# Patient Record
Sex: MALE | Race: WHITE | NOT HISPANIC OR LATINO | Employment: STUDENT | URBAN - METROPOLITAN AREA
[De-identification: names, ages, dates, MRNs, and addresses within clinical notes are randomized per-mention and may not be internally consistent; named-entity substitution may affect disease eponyms.]

---

## 2020-01-08 ENCOUNTER — TRANSFERRED RECORDS (OUTPATIENT)
Dept: HEALTH INFORMATION MANAGEMENT | Facility: CLINIC | Age: 20
End: 2020-01-08

## 2022-03-09 ENCOUNTER — TRANSFERRED RECORDS (OUTPATIENT)
Dept: HEALTH INFORMATION MANAGEMENT | Facility: CLINIC | Age: 22
End: 2022-03-09

## 2023-09-19 ENCOUNTER — OFFICE VISIT (OUTPATIENT)
Dept: FAMILY MEDICINE | Facility: CLINIC | Age: 23
End: 2023-09-19
Payer: COMMERCIAL

## 2023-09-19 VITALS
HEART RATE: 80 BPM | WEIGHT: 183.3 LBS | RESPIRATION RATE: 13 BRPM | BODY MASS INDEX: 27.15 KG/M2 | SYSTOLIC BLOOD PRESSURE: 108 MMHG | DIASTOLIC BLOOD PRESSURE: 74 MMHG | HEIGHT: 69 IN | TEMPERATURE: 97.6 F

## 2023-09-19 DIAGNOSIS — F32.A ANXIETY AND DEPRESSION: ICD-10-CM

## 2023-09-19 DIAGNOSIS — Z00.00 ROUTINE GENERAL MEDICAL EXAMINATION AT A HEALTH CARE FACILITY: ICD-10-CM

## 2023-09-19 DIAGNOSIS — F41.9 ANXIETY AND DEPRESSION: ICD-10-CM

## 2023-09-19 DIAGNOSIS — K52.9 CHRONIC DIARRHEA: ICD-10-CM

## 2023-09-19 DIAGNOSIS — Z11.59 NEED FOR HEPATITIS C SCREENING TEST: ICD-10-CM

## 2023-09-19 DIAGNOSIS — G43.109 MIGRAINE WITH AURA AND WITHOUT STATUS MIGRAINOSUS, NOT INTRACTABLE: ICD-10-CM

## 2023-09-19 LAB
ALBUMIN SERPL BCG-MCNC: 4.7 G/DL (ref 3.5–5.2)
ALP SERPL-CCNC: 72 U/L (ref 40–129)
ALT SERPL W P-5'-P-CCNC: 22 U/L (ref 0–70)
ANION GAP SERPL CALCULATED.3IONS-SCNC: 10 MMOL/L (ref 7–15)
AST SERPL W P-5'-P-CCNC: 29 U/L (ref 0–45)
BILIRUB SERPL-MCNC: 0.3 MG/DL
BUN SERPL-MCNC: 13.6 MG/DL (ref 6–20)
CALCIUM SERPL-MCNC: 9.5 MG/DL (ref 8.6–10)
CHLORIDE SERPL-SCNC: 103 MMOL/L (ref 98–107)
CHOLEST SERPL-MCNC: 124 MG/DL
CREAT SERPL-MCNC: 0.92 MG/DL (ref 0.67–1.17)
CRP SERPL-MCNC: <3 MG/L
DEPRECATED HCO3 PLAS-SCNC: 27 MMOL/L (ref 22–29)
EGFRCR SERPLBLD CKD-EPI 2021: >90 ML/MIN/1.73M2
ERYTHROCYTE [DISTWIDTH] IN BLOOD BY AUTOMATED COUNT: 12.1 % (ref 10–15)
ERYTHROCYTE [SEDIMENTATION RATE] IN BLOOD BY WESTERGREN METHOD: 2 MM/HR (ref 0–15)
GLUCOSE SERPL-MCNC: 90 MG/DL (ref 70–99)
HCT VFR BLD AUTO: 48.2 % (ref 40–53)
HDLC SERPL-MCNC: 35 MG/DL
HGB BLD-MCNC: 16.2 G/DL (ref 13.3–17.7)
LDLC SERPL CALC-MCNC: 52 MG/DL
MCH RBC QN AUTO: 30.3 PG (ref 26.5–33)
MCHC RBC AUTO-ENTMCNC: 33.6 G/DL (ref 31.5–36.5)
MCV RBC AUTO: 90 FL (ref 78–100)
NONHDLC SERPL-MCNC: 89 MG/DL
PLATELET # BLD AUTO: 228 10E3/UL (ref 150–450)
POTASSIUM SERPL-SCNC: 4.4 MMOL/L (ref 3.4–5.3)
PROT SERPL-MCNC: 7 G/DL (ref 6.4–8.3)
RBC # BLD AUTO: 5.35 10E6/UL (ref 4.4–5.9)
SODIUM SERPL-SCNC: 140 MMOL/L (ref 136–145)
TRIGL SERPL-MCNC: 187 MG/DL
WBC # BLD AUTO: 7.3 10E3/UL (ref 4–11)

## 2023-09-19 PROCEDURE — 99385 PREV VISIT NEW AGE 18-39: CPT | Performed by: NURSE PRACTITIONER

## 2023-09-19 PROCEDURE — 85027 COMPLETE CBC AUTOMATED: CPT | Performed by: NURSE PRACTITIONER

## 2023-09-19 PROCEDURE — 36415 COLL VENOUS BLD VENIPUNCTURE: CPT | Performed by: NURSE PRACTITIONER

## 2023-09-19 PROCEDURE — 86803 HEPATITIS C AB TEST: CPT | Performed by: NURSE PRACTITIONER

## 2023-09-19 PROCEDURE — 85652 RBC SED RATE AUTOMATED: CPT | Performed by: NURSE PRACTITIONER

## 2023-09-19 PROCEDURE — 80053 COMPREHEN METABOLIC PANEL: CPT | Performed by: NURSE PRACTITIONER

## 2023-09-19 PROCEDURE — 80061 LIPID PANEL: CPT | Performed by: NURSE PRACTITIONER

## 2023-09-19 PROCEDURE — 86140 C-REACTIVE PROTEIN: CPT | Performed by: NURSE PRACTITIONER

## 2023-09-19 RX ORDER — PAROXETINE 10 MG/1
TABLET, FILM COATED ORAL
Qty: 94 TABLET | Refills: 0 | OUTPATIENT
Start: 2023-09-19 | End: 2023-11-05

## 2023-09-19 RX ORDER — PAROXETINE 10 MG/1
TABLET, FILM COATED ORAL
Qty: 94 TABLET | Refills: 0 | Status: SHIPPED | OUTPATIENT
Start: 2023-09-19 | End: 2023-10-18

## 2023-09-19 ASSESSMENT — PAIN SCALES - GENERAL: PAINLEVEL: NO PAIN (0)

## 2023-09-19 ASSESSMENT — ENCOUNTER SYMPTOMS
NAUSEA: 0
SHORTNESS OF BREATH: 0
DYSURIA: 0
EYE PAIN: 0
DIARRHEA: 1
HEMATOCHEZIA: 0
CONSTIPATION: 0
ARTHRALGIAS: 0
COUGH: 0
PARESTHESIAS: 0
MYALGIAS: 0
JOINT SWELLING: 0
WEAKNESS: 0
CHILLS: 0
HEADACHES: 1
SORE THROAT: 0
FREQUENCY: 0
HEARTBURN: 0
FEVER: 0
DIZZINESS: 0
ABDOMINAL PAIN: 0
HEMATURIA: 0
NERVOUS/ANXIOUS: 1
PALPITATIONS: 0

## 2023-09-19 ASSESSMENT — PATIENT HEALTH QUESTIONNAIRE - PHQ9: SUM OF ALL RESPONSES TO PHQ QUESTIONS 1-9: 10

## 2023-09-19 NOTE — PROGRESS NOTES
SUBJECTIVE:   CC: Jose Manuel is an 23 year old with past medical history of migraine with aura, anxiety and depression, who presents for preventative health visit.     Concerns/Updates:   Anxiety and depression: Has been receiving weekly psychotherapy at outside location.Therapist recommended starting a medication and he would like a prescription.  Chronic diarrhea for about 2 years. Has been taking pepto-bismol as needed, metamucil, and a probiotic. Those meds help a bit but symptoms persist. Anxiety triggers the diarrhea - having episodes maybe one a week. No fever.  Migraines with aura since 2016- relieved by OTC Excedrin.     He is originally from [place], [place]. Moved to [place] last year for [employer]. 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  Denies drinking alcohol, smoking, or drug use  Declines STI screening- has not been sexually active since his last STI screening about 2 years ago.        9/19/2023     1:56 PM   Additional Questions   Roomed by Vero PIKE       Healthy Habits:     Getting at least 3 servings of Calcium per day:  Yes    Bi-annual eye exam:  Yes    Dental care twice a year:  Yes    Sleep apnea or symptoms of sleep apnea:  Daytime drowsiness    Diet:  Regular (no restrictions)    Frequency of exercise:  2-3 days/week    Duration of exercise:  45-60 minutes    Taking medications regularly:  Not Applicable    Medication side effects:  Not applicable    Additional concerns today:  Yes      Today's PHQ-2 Score:       9/19/2023     2:08 PM   PHQ-2 ( 1999 [employer])   Q1: Little interest or pleasure in doing things 1   Q2: Feeling down, depressed or hopeless 1   PHQ-2 Score 2   Q1: Little interest or pleasure in doing things Several days   Q2: Feeling down, depressed or hopeless Several days   PHQ-2 Score 2     PATIENT HEALTH QUESTIONNAIRE-9 (PHQ - 9)    Over the last 2 weeks, how often have  you been bothered by any of the following problems?    1. Little interest or pleasure in doing things -  Several days   2. Feeling down, depressed, or hopeless -  More than half the days   3. Trouble falling or staying asleep, or sleeping too much - Nearly every day   4. Feeling tired or having little energy -  Nearly every day   5. Poor appetite or overeating -  Not at all   6. Feeling bad about yourself - or that you are a failure or have let yourself or your family down -  Several days   7. Trouble concentrating on things, such as reading the newspaper or watching television - Not at all   8. Moving or speaking so slowly that other people could have noticed? Or the opposite - being so fidgety or restless that you have been moving around a lot more than usual Not at all   9. Thoughts that you would be better off dead or of hurting  yourself in some way Not at all   Total Score: 10     If you checked off any problems, how difficult have these problems made it for you to do your work, take care of things at home, or get along with other people? Somewhat difficult    Developed by Bhakti Lira, Alma Hawkins, Wilfrido Cardenas and colleagues, with an educational grace from [employer] No permission required to reproduce, translate, display or distribute. permission required to reproduce, translate, display or distribute.      Have you ever done Advance Care Planning? (For example, a Health Directive, POLST, or a discussion with a medical provider or your loved ones about your wishes): Yes, patient states has an Advance Care Planning document and will bring a copy to the clinic.    Social History     Tobacco Use    Smoking status: Not on file    Smokeless tobacco: Not on file   Substance Use Topics    Alcohol use: Not on file             9/19/2023     2:07 PM   Alcohol Use   Prescreen: >3 drinks/day or >7 drinks/week? No       Last PSA: No results found for: PSA    Reviewed orders with patient. Reviewed  "health maintenance and updated orders accordingly - Yes  Lab work is in process  Labs reviewed in EPIC    Reviewed and updated as needed this visit by clinical staff    Allergies  Meds  Problems  Med Hx  Surg Hx  Fam Hx          Reviewed and updated as needed this visit by Provider    Allergies  Meds  Problems  Med Hx  Surg Hx  Fam Hx         History reviewed. No pertinent past medical history.   History reviewed. No pertinent surgical history.    Review of Systems   Constitutional:  Negative for chills and fever.   HENT:  Negative for congestion, ear pain, hearing loss and sore throat.    Eyes:  Negative for pain and visual disturbance.   Respiratory:  Negative for cough and shortness of breath.    Cardiovascular:  Negative for chest pain, palpitations and peripheral edema.   Gastrointestinal:  Positive for diarrhea. Negative for abdominal pain, constipation, heartburn, hematochezia and nausea.   Genitourinary:  Negative for dysuria, frequency, genital sores, hematuria, impotence, penile discharge and urgency.   Musculoskeletal:  Negative for arthralgias, joint swelling and myalgias.   Skin:  Negative for rash.   Neurological:  Positive for headaches. Negative for dizziness, weakness and paresthesias.   Psychiatric/Behavioral:  Negative for mood changes. The patient is nervous/anxious.          OBJECTIVE:   /74   Pulse 80   Temp 97.6  F (36.4  C) (Tympanic)   Resp 13   Ht 1.755 m (5' 9.09\")   Wt 83.1 kg (183 lb 4.8 oz)   BMI 27.00 kg/m      Physical Exam  GENERAL: healthy, alert and no distress  EYES: Eyes grossly normal to inspection, PERRL and conjunctivae and sclerae normal  HENT: ear canals and TM's normal, nose and mouth without ulcers or lesions  NECK: no adenopathy, no asymmetry, masses, or scars and thyroid normal to palpation  RESP: lungs clear to auscultation - no rales, rhonchi or wheezes  CV: regular rate and rhythm, normal S1 S2, no S3 or S4, no murmur, click or rub, no " peripheral edema and peripheral pulses strong  ABDOMEN: soft, nontender, no hepatosplenomegaly, no masses and bowel sounds normal  MS: no gross musculoskeletal defects noted, no edema  SKIN: no suspicious lesions or rashes  NEURO: Normal strength and tone, mentation intact and speech normal  PSYCH: mentation appears normal, affect normal/bright    Diagnostic Test Results:  Labs reviewed in Epic    ASSESSMENT/PLAN:   Jose Manuel was seen today for annual visit.    Diagnoses and all orders for this visit:    Routine general medical examination at a health care facility  -     REVIEW OF HEALTH MAINTENANCE PROTOCOL ORDERS  -     Lipid panel reflex to direct LDL Fasting; Future  -     Lipid panel reflex to direct LDL Fasting   -Declines vaccines today     Need for hepatitis C screening test  -     Hepatitis C Screen Reflex to HCV RNA Quant and Genotype; Future  -     Hepatitis C Screen Reflex to HCV RNA Quant and Genotype    Migraine with aura and without status migrainosus, not intractable  Controlled with Excedrin    Chronic diarrhea  -     Adult GI  Referral - Consult Only; Future  -     ESR: Erythrocyte sedimentation rate; Future  -     CRP, inflammation; Future  -     CBC with platelets; Future  -     Comprehensive metabolic panel (BMP + Alb, Alk Phos, ALT, AST, Total. Bili, TP); Future  -     Calprotectin Feces; Future  -     ESR: Erythrocyte sedimentation rate  -     CRP, inflammation  -     CBC with platelets  -     Comprehensive metabolic panel (BMP + Alb, Alk Phos, ALT, AST, Total. Bili, TP)  -     Calprotectin Feces    Anxiety and depression  -     PARoxetine (PAXIL) 10 MG tablet; Take 2 tablets (20 mg) by mouth every morning for 7 days, THEN 2 tablets (20 mg) every morning for 40 days.   -Continue psychotherapy   -f/up in 4 weeks    COUNSELING:   Reviewed preventive health counseling, as reflected in patient instructions      BMI:   Estimated body mass index is 27 kg/m  as calculated from the  "following:    Height as of this encounter: 1.755 m (5' 9.09\").    Weight as of this encounter: 83.1 kg (183 lb 4.8 oz).   Weight management plan: Discussed healthy diet and exercise guidelines      He has no history on file for tobacco use.            JAMI Colmenares CNP  [facility]  "

## 2023-09-20 PROBLEM — F41.9 ANXIETY AND DEPRESSION: Status: ACTIVE | Noted: 2023-09-20

## 2023-09-20 PROBLEM — F32.A ANXIETY AND DEPRESSION: Status: ACTIVE | Noted: 2023-09-20

## 2023-09-21 ENCOUNTER — APPOINTMENT (OUTPATIENT)
Dept: LAB | Facility: CLINIC | Age: 23
End: 2023-09-21
Payer: COMMERCIAL

## 2023-09-21 LAB — HCV AB SERPL QL IA: NONREACTIVE

## 2023-09-21 PROCEDURE — 83993 ASSAY FOR CALPROTECTIN FECAL: CPT | Performed by: NURSE PRACTITIONER

## 2023-09-22 LAB — CALPROTECTIN STL-MCNT: 17.9 MG/KG (ref 0–49.9)

## 2023-10-03 NOTE — TELEPHONE ENCOUNTER
REFERRAL INFORMATION:  Referring Provider:  Es Mcclain APRN CNP  Referring Clinic:  [facility]   Reason for Visit/Diagnosis: Chronic diarrhea [K52.9]     FUTURE VISIT INFORMATION:  Appointment Date: 10/5/23  Appointment Time: 11:00 AM      NOTES STATUS DETAILS   OFFICE NOTE from Referring Provider Internal 9/19/23- Es Mcclain APRN CNP - [facility] - Chronic Diarrhea    MEDICATION LIST Internal         STOOL TESTING Internal Calprotectin Feces- 9/21/23   PERTINENT LABS Internal CBC w/ Dif; CMP; CRP Inflammation - 9/19/23

## 2023-10-05 ENCOUNTER — OFFICE VISIT (OUTPATIENT)
Dept: GASTROENTEROLOGY | Facility: CLINIC | Age: 23
End: 2023-10-05
Attending: NURSE PRACTITIONER
Payer: COMMERCIAL

## 2023-10-05 ENCOUNTER — PRE VISIT (OUTPATIENT)
Dept: GASTROENTEROLOGY | Facility: CLINIC | Age: 23
End: 2023-10-05

## 2023-10-05 ENCOUNTER — LAB (OUTPATIENT)
Dept: LAB | Facility: CLINIC | Age: 23
End: 2023-10-05
Payer: COMMERCIAL

## 2023-10-05 VITALS
HEART RATE: 94 BPM | WEIGHT: 184.1 LBS | SYSTOLIC BLOOD PRESSURE: 131 MMHG | BODY MASS INDEX: 27.27 KG/M2 | HEIGHT: 69 IN | DIASTOLIC BLOOD PRESSURE: 81 MMHG

## 2023-10-05 DIAGNOSIS — K52.9 CHRONIC DIARRHEA: ICD-10-CM

## 2023-10-05 LAB — TSH SERPL DL<=0.005 MIU/L-ACNC: 1.14 UIU/ML (ref 0.3–4.2)

## 2023-10-05 PROCEDURE — 82784 ASSAY IGA/IGD/IGG/IGM EACH: CPT | Performed by: PHYSICIAN ASSISTANT

## 2023-10-05 PROCEDURE — 99205 OFFICE O/P NEW HI 60 MIN: CPT | Performed by: PHYSICIAN ASSISTANT

## 2023-10-05 PROCEDURE — 99000 SPECIMEN HANDLING OFFICE-LAB: CPT | Performed by: PATHOLOGY

## 2023-10-05 PROCEDURE — 86364 TISS TRNSGLTMNASE EA IG CLAS: CPT | Performed by: PHYSICIAN ASSISTANT

## 2023-10-05 PROCEDURE — 84443 ASSAY THYROID STIM HORMONE: CPT | Performed by: PATHOLOGY

## 2023-10-05 PROCEDURE — 36415 COLL VENOUS BLD VENIPUNCTURE: CPT | Performed by: PATHOLOGY

## 2023-10-05 RX ORDER — CETIRIZINE HYDROCHLORIDE 5 MG/1
5 TABLET ORAL DAILY
COMMUNITY

## 2023-10-05 RX ORDER — LACTOBACILLUS RHAMNOSUS GG 10B CELL
1 CAPSULE ORAL 2 TIMES DAILY
COMMUNITY

## 2023-10-05 NOTE — NURSING NOTE
"Chief Complaint   Patient presents with    New Patient     Diarrhea        Vitals:    10/05/23 1051   BP: 131/81   BP Location: Left arm   Patient Position: Sitting   Cuff Size: Adult Regular   Pulse: 94   Weight: 83.5 kg (184 lb 1.6 oz)   Height: 1.753 m (5' 9\")       Body mass index is 27.19 kg/m .      Carlie Lema LPN                      "

## 2023-10-05 NOTE — LETTER
"    10/5/2023         RE: Jose Manuel Johnson  [address] [place] [address]        Dear Colleague,    Thank you for referring your patient, Jose Manuel Johnson, to the [facility]. Please see a copy of my visit note below.      GI CLINIC VISIT    CC/REFERRING MD:  Es Clemens  REASON FOR CONSULTATION: Diagnoses: K52.9 (ICD-10-CM) - Chronic diarrhea     Chart Review  9/19 - FM appt w/ mention of chronic diarrhea x 2 year worsened w/ anxiety  ESR/CRP WNL  CBC, CMP wnl  Feces calpro WNL    HPI  Jose Manuel Johnson is a 23 year old year old male with PMHx of anxiety/depression, migraines, lactose intolerance presenting to establish care with the [facility] GI group for chronic diarrhea.     -Reports he has always had a \"nervous stomach\" that flared in pain associated with increased freq of stool and change in stooling (more loose) around stressful events (tests at school).   Does have lactose intolerance which is managed with ingestion of lactaid.     -In last 1-2 years, this diarrhea pattern worsened where before it was intermittent, he was then having daily episodes of loose stools 1-2x/d of BSC type 4-5.     -In last 6 mo, diarrhea patterned worsened in both consistency and frequency prompting him to take daily pepto in order to manage it.  His typical pattern in past 6 mo is the following -- after breakfast, he will have 3 coupled BM described as BSC type 6 then take 1 tabs of pepto bismol. He will then not have any diarrhea or BMs after the pepto. Stooling associated with sensation of tenesmus, gas and passage of mucus.     +Infrequent heartburn maybe 1x every few weeks.   No dysphagia/odynphagia.   +Bloat after starting paxil 1 mo ago. Denies worsening of diarrhea w/ start of paxil.   In July had 2-3d of bad abd pain that self-resolved     Exposure   -Feb 2023 he lived in a NH for 2.5 months. 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 "   -He is currently working in another NH in [facility].     Abx   -Feb 2023 a course of amoxil for sinus infection   -July/Aug 2023 - dental surgery with unknown abx     He started taking probiotics 24 billion CFU, metamucil 2 tsp daily which he feels has helped.   Never required UC/ER eval for fluid replacement / dehydration.     Overall feels his mental health has improved in last 2 years yet diarrhea is worsening.     Weight stable  Appetite stable     Diet Recall  B - honey nut cherrios / cereal  L - ham sandwich with guillaume or PB sandwich, hummus/franny, fruit  D - protein, veggies, carbs, salad whatever  cooks  Snacking - [place] almond butter, banana / fruit     ROS  Denies fevers, chills, weight loss, nausea, emesis, dysphagia, odynophagia, regurgitation,current  abdominal pain,  black tarrying stools, bloody stools, rectal pain, rectal fullness, rectal itching.     Family Hx  Sister with IBS   Dad with 'nervous stomach'  PGM - with 'nervous stomach' hypervigilant when outdoors, mapping location of toilets   Routine colonscopy for both parents, both with polyps (unknown type)      No other known family history or GI related malignancy (esophageal, gastric, pancreatic, liver or colon) or family history of IBD/celiac disease.     Social Hx   Student studying to become a [employer]; in year 2 of 11 year program     Will take Ibuprofen 400 mg, or APAP 1gram once weekly for headaches   No use of OTC herbal supplements/weight loss products.      No ETOH  Never tobacco products.   No recreational drug use.     PROBLEM LIST  Patient Active Problem List    Diagnosis Date Noted    Anxiety and depression 09/20/2023     Priority: Medium    Migraine with aura and without status migrainosus, not intractable 09/19/2023     Priority: Medium    Chronic diarrhea 09/19/2023     Priority: Medium       PERTINENT PAST MEDICAL HISTORY:  No past medical history on file.    PREVIOUS SURGERIES:  No past surgical history on  file.    ALLERGIES:   No Known Allergies    PERTINENT MEDICATIONS:    Current Outpatient Medications:     PARoxetine (PAXIL) 10 MG tablet, Take 2 tablets (20 mg) by mouth every morning for 7 days, THEN 2 tablets (20 mg) every morning for 40 days., Disp: 94 tablet, Rfl: 0    SOCIAL HISTORY:  Social History     Socioeconomic History    Marital status: Single     Spouse name: Not on file    Number of children: Not on file    Years of education: Not on file    Highest education level: Not on file   Occupational History    Not on file   Tobacco Use    Smoking status: Not on file    Smokeless tobacco: Not on file   Substance and Sexual Activity    Alcohol use: Not on file    Drug use: Not on file    Sexual activity: Not on file   Other Topics Concern    Not on file   Social History Narrative    Not on file     Social Determinants of Health     Financial Resource Strain: Not on file   Food Insecurity: Not on file   Transportation Needs: Not on file   Physical Activity: Not on file   Stress: Not on file   Social Connections: Not on file   Interpersonal Safety: Not on file   Housing Stability: Not on file       FAMILY HISTORY:  No family history on file.    Past/family/social history reviewed and no changes    PHYSICAL EXAMINATION:  Vitals reviewed: There were no vitals taken for this visit.  Wt:   Wt Readings from Last 2 Encounters:   09/19/23 83.1 kg (183 lb 4.8 oz)        Constitutional: aaox3, cooperative, pleasant, not dyspneic/diaphoretic, no acute distress  Eyes: Sclera anicteric/injected  Ears/nose/mouth/throat: hearing intact  Neck: supple, active ROM w/o limitation or pain   CV: No edema  Respiratory: Unlabored breathing  Abd: Nondistended, +bs, no hepatosplenomegaly, nontender, no peritoneal signs, neg Garcia's sign   Skin: warm, perfused, no jaundice  Psych: Normal affect  MSK: Normal gait     PERTINENT STUDIES:    Office Visit on 09/19/2023   Component Date Value Ref Range Status    Cholesterol 09/19/2023 124   <200 mg/dL Final    Triglycerides 09/19/2023 187 (H)  <150 mg/dL Final    Direct Measure HDL 09/19/2023 35 (L)  >=40 mg/dL Final    LDL Cholesterol Calculated 09/19/2023 52  <=100 mg/dL Final    Non HDL Cholesterol 09/19/2023 89  <130 mg/dL Final    Erythrocyte Sedimentation Rate 09/19/2023 2  0 - 15 mm/hr Final    CRP Inflammation 09/19/2023 <3.00  <5.00 mg/L Final    WBC Count 09/19/2023 7.3  4.0 - 11.0 10e3/uL Final    RBC Count 09/19/2023 5.35  4.40 - 5.90 10e6/uL Final    Hemoglobin 09/19/2023 16.2  13.3 - 17.7 g/dL Final    Hematocrit 09/19/2023 48.2  40.0 - 53.0 % Final    MCV 09/19/2023 90  78 - 100 fL Final    MCH 09/19/2023 30.3  26.5 - 33.0 pg Final    MCHC 09/19/2023 33.6  31.5 - 36.5 g/dL Final    RDW 09/19/2023 12.1  10.0 - 15.0 % Final    Platelet Count 09/19/2023 228  150 - 450 10e3/uL Final    Sodium 09/19/2023 140  136 - 145 mmol/L Final    Potassium 09/19/2023 4.4  3.4 - 5.3 mmol/L Final    Chloride 09/19/2023 103  98 - 107 mmol/L Final    Carbon Dioxide (CO2) 09/19/2023 27  22 - 29 mmol/L Final    Anion Gap 09/19/2023 10  7 - 15 mmol/L Final    Urea Nitrogen 09/19/2023 13.6  6.0 - 20.0 mg/dL Final    Creatinine 09/19/2023 0.92  0.67 - 1.17 mg/dL Final    Calcium 09/19/2023 9.5  8.6 - 10.0 mg/dL Final    Glucose 09/19/2023 90  70 - 99 mg/dL Final    Alkaline Phosphatase 09/19/2023 72  40 - 129 U/L Final    AST 09/19/2023 29  0 - 45 U/L Final    ALT 09/19/2023 22  0 - 70 U/L Final    Protein Total 09/19/2023 7.0  6.4 - 8.3 g/dL Final    Albumin 09/19/2023 4.7  3.5 - 5.2 g/dL Final    Bilirubin Total 09/19/2023 0.3  <=1.2 mg/dL Final    GFR Estimate 09/19/2023 >90  >60 mL/min/1.73m2 Final    Hepatitis C Antibody 09/19/2023 Nonreactive  Nonreactive Final    Calprotectin Feces 09/21/2023 17.9  0.0 - 49.9 mg/kg Final        Lab Results   Component Value Date    WBC 7.3 09/19/2023    HGB 16.2 09/19/2023     09/19/2023    CHOL 124 09/19/2023    TRIG 187 (H) 09/19/2023    HDL 35 (L) 09/19/2023     ALT 22 09/19/2023    AST 29 09/19/2023     09/19/2023    BUN 13.6 09/19/2023    CO2 27 09/19/2023        Liver Function Studies -   Recent Labs   Lab Test 09/19/23  1523   PROTTOTAL 7.0   ALBUMIN 4.7   BILITOTAL 0.3   ALKPHOS 72   AST 29   ALT 22        PREVIOUS ENDOSCOPY    No results found for this or any previous visit.    ASSESSMENT/PLAN:  Jose Manuel Johnson is a 23 year old year old male with  23 year old year old male with PMHx of anxiety/depression, migraines, lactose intolerance presenting to establish care with the [facility] GI group for chronic diarrhea x a few years that worsened in last 6 months for unclear reasons. Triggers include stress and dairy. 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 He has had recent labs drawn including normal CRP/ESR, CBC, CMP and normal fecal calpro. He presents today for further eval of this diarrhea.     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 Consider infectious diarrhea given exposure to c.diff and 2 course of abx, malabsorption (c.diff, SIBO), hyperthyroidism, med induced vs other. Less likely inflammatory diarrhea given recent normal fecal calpro, ESR/CRP but will consider in DDX.   -hemodynamically stable with recent normal renal function and electrolytes. Maintaining oral intake.   -check infectious stool panels - c diff, enteric, ova and parasite   -check TTG Ab with total IgA (he is eating gluten)  -check TSH to help guide care   -continue daily metamucil + dietary fiber, per AVS   -Paxil is new in last month with listed GI s/e to include diarrhea and constipation though he denies worsening of diarrhea w/ start of med   >10% - Gastrointestinal: Constipation (2% to 16%), decreased appetite (1% to 12%), diarrhea (6% to 18%), dyspepsia (2% to 13%), nausea (17% to 26%), xerostomia (3% to 18%)   1-10% - Gastrointestinal: Abdominal pain (4% to 7%), dysgeusia (2%), flatulence (4% to  6%), increased appetite (2% to 4%), nausea and vomiting (4%), vomiting (2% to 3%; more frequent in children [two- to threefold] and adolescents) (Safer 2006)     Future consideration  1. More testing would include breath test for SIBO, fecal elastase   2. Consider scheduled imodium trial if infectious stool studies are neg  3. IB Gita for sx flare  4. GI RD consult for low fodmap diet and retrial   5. Consider [facility] referral  6. If attempts to manage diarrhea are not fully successful, consider c-scope with random colon biopsies       Orders Placed This Encounter   Procedures    TSH with free T4 reflex     Standing Status:   Future     Number of Occurrences:   1     Standing Expiration Date:   10/5/2024    Tissue transglutaminase nieves IgA and IgG     Standing Status:   Future     Number of Occurrences:   1     Standing Expiration Date:   10/5/2024    IgA     Standing Status:   Future     Number of Occurrences:   1     Standing Expiration Date:   10/5/2024    Enteric Bacteria and Virus Panel by BHANU Stool     Standing Status:   Future     Number of Occurrences:   1     Standing Expiration Date:   10/5/2024     Order Specific Question:   Has patient been hospitalized for greater than 3 days?     Answer:   No    Ova and Parasite Exam Routine     Standing Status:   Future     Number of Occurrences:   1     Standing Expiration Date:   10/5/2024     Order Specific Question:   Consider ordering the Enteric Pathogen and Virus Panel PCR ([id number]), unless one of the following apply (select one):     Answer:   Chronic Diarrhea (>2 weeks) with negative PCR ([id number])    C. difficile Toxin B PCR with reflex to C. difficile Antigen and Toxins A/B EIA     Standing Status:   Future     Number of Occurrences:   1     Standing Expiration Date:   11/4/2023     Colorectal cancer screening: aged 45, unless otherwise indicated     RTC 4-6 weeks or sooner if needed     Thank you for this consultation.  It was a pleasure to participate  in the care of this patient; please contact me with any further questions.       Follow up: As planned above. Today, I personally spent 40 minutes in direct face to face time with the patient, of which greater than 50% of the time was spent in patient education and counseling as described above. Approximately 20 minutes were spent on indirect care associated with the patient's consultation including but not limited to review of: patient medical records to date, clinic visits, hospital records, lab results, imaging studies, procedural documentation, and coordinating care with other providers. The findings from this review are summarized in the above note. All of the above accounted for a cumulative time of 60 minutes and was performed on the date of service.         Again, thank you for allowing me to participate in the care of your patient.      Sincerely,    Berna Martin PA-C

## 2023-10-05 NOTE — PROGRESS NOTES
"  GI CLINIC VISIT    CC/REFERRING MD:  Es Clemens  REASON FOR CONSULTATION: Diagnoses: K52.9 (ICD-10-CM) - Chronic diarrhea     Chart Review  9/19 - FM appt w/ mention of chronic diarrhea x 2 year worsened w/ anxiety  ESR/CRP WNL  CBC, CMP wnl  Feces calpro WNL    HPI  Jose Manuel Johnson is a 23 year old year old male with PMHx of anxiety/depression, migraines, lactose intolerance presenting to establish care with the [facility] GI group for chronic diarrhea.     -Reports he has always had a \"nervous stomach\" that flared in pain associated with increased freq of stool and change in stooling (more loose) around stressful events (tests at school).   Does have lactose intolerance which is managed with ingestion of lactaid.     -In last 1-2 years, this diarrhea pattern worsened where before it was intermittent, he was then having daily episodes of loose stools 1-2x/d of BSC type 4-5.     -In last 6 mo, diarrhea patterned worsened in both consistency and frequency prompting him to take daily pepto in order to manage it.  His typical pattern in past 6 mo is the following -- after breakfast, he will have 3 coupled BM described as BSC type 6 then take 1 tabs of pepto bismol. He will then not have any diarrhea or BMs after the pepto. Stooling associated with sensation of tenesmus, gas and passage of mucus.     +Infrequent heartburn maybe 1x every few weeks.   No dysphagia/odynphagia.   +Bloat after starting paxil 1 mo ago. Denies worsening of diarrhea w/ start of paxil.   In July had 2-3d of bad abd pain that self-resolved     Exposure   -Feb 2023 he lived in a NH for 2.5 months. 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   -He is currently working in another NH in [place].     Abx   -Feb 2023 a course of amoxil for sinus infection   -July/Aug 2023 - dental surgery with unknown abx     He started taking probiotics 24 billion CFU, metamucil 2 tsp daily which he feels has helped.   Never " required UC/ER eval for fluid replacement / dehydration.     Overall feels his mental health has improved in last 2 years yet diarrhea is worsening.     Weight stable  Appetite stable     Diet Recall  B - honey nut cherrios / cereal  L - ham sandwich with guillaume or PB sandwich, hummus/franny, fruit  D - protein, veggies, carbs, salad whatever  cooks  Snacking - [place] almond butter, banana / fruit     ROS  Denies fevers, chills, weight loss, nausea, emesis, dysphagia, odynophagia, regurgitation,current  abdominal pain,  black tarrying stools, bloody stools, rectal pain, rectal fullness, rectal itching.     Family Hx  Sister with IBS   Dad with 'nervous stomach'  PGM - with 'nervous stomach' hypervigilant when outdoors, mapping location of toilets   Routine colonscopy for both parents, both with polyps (unknown type)      No other known family history or GI related malignancy (esophageal, gastric, pancreatic, liver or colon) or family history of IBD/celiac disease.     Social Hx   Student studying to become a [employer]; in year 2 of 11 year program     Will take Ibuprofen 400 mg, or APAP 1gram once weekly for headaches   No use of OTC herbal supplements/weight loss products.      No ETOH  Never tobacco products.   No recreational drug use.     PROBLEM LIST  Patient Active Problem List    Diagnosis Date Noted     Anxiety and depression 09/20/2023     Priority: Medium     Migraine with aura and without status migrainosus, not intractable 09/19/2023     Priority: Medium     Chronic diarrhea 09/19/2023     Priority: Medium       PERTINENT PAST MEDICAL HISTORY:  No past medical history on file.    PREVIOUS SURGERIES:  No past surgical history on file.    ALLERGIES:   No Known Allergies    PERTINENT MEDICATIONS:    Current Outpatient Medications:      PARoxetine (PAXIL) 10 MG tablet, Take 2 tablets (20 mg) by mouth every morning for 7 days, THEN 2 tablets (20 mg) every morning for 40 days., Disp: 94 tablet, Rfl:  0    SOCIAL HISTORY:  Social History     Socioeconomic History     Marital status: Single     Spouse name: Not on file     Number of children: Not on file     Years of education: Not on file     Highest education level: Not on file   Occupational History     Not on file   Tobacco Use     Smoking status: Not on file     Smokeless tobacco: Not on file   Substance and Sexual Activity     Alcohol use: Not on file     Drug use: Not on file     Sexual activity: Not on file   Other Topics Concern     Not on file   Social History Narrative     Not on file     Social Determinants of Health     Financial Resource Strain: Not on file   Food Insecurity: Not on file   Transportation Needs: Not on file   Physical Activity: Not on file   Stress: Not on file   Social Connections: Not on file   Interpersonal Safety: Not on file   Housing Stability: Not on file       FAMILY HISTORY:  No family history on file.    Past/family/social history reviewed and no changes    PHYSICAL EXAMINATION:  Vitals reviewed: There were no vitals taken for this visit.  Wt:   Wt Readings from Last 2 Encounters:   09/19/23 83.1 kg (183 lb 4.8 oz)        Constitutional: aaox3, cooperative, pleasant, not dyspneic/diaphoretic, no acute distress  Eyes: Sclera anicteric/injected  Ears/nose/mouth/throat: hearing intact  Neck: supple, active ROM w/o limitation or pain   CV: No edema  Respiratory: Unlabored breathing  Abd: Nondistended, +bs, no hepatosplenomegaly, nontender, no peritoneal signs, neg Garcia's sign   Skin: warm, perfused, no jaundice  Psych: Normal affect  MSK: Normal gait     PERTINENT STUDIES:    Office Visit on 09/19/2023   Component Date Value Ref Range Status     Cholesterol 09/19/2023 124  <200 mg/dL Final     Triglycerides 09/19/2023 187 (H)  <150 mg/dL Final     Direct Measure HDL 09/19/2023 35 (L)  >=40 mg/dL Final     LDL Cholesterol Calculated 09/19/2023 52  <=100 mg/dL Final     Non HDL Cholesterol 09/19/2023 89  <130 mg/dL Final      Erythrocyte Sedimentation Rate 09/19/2023 2  0 - 15 mm/hr Final     CRP Inflammation 09/19/2023 <3.00  <5.00 mg/L Final     WBC Count 09/19/2023 7.3  4.0 - 11.0 10e3/uL Final     RBC Count 09/19/2023 5.35  4.40 - 5.90 10e6/uL Final     Hemoglobin 09/19/2023 16.2  13.3 - 17.7 g/dL Final     Hematocrit 09/19/2023 48.2  40.0 - 53.0 % Final     MCV 09/19/2023 90  78 - 100 fL Final     MCH 09/19/2023 30.3  26.5 - 33.0 pg Final     MCHC 09/19/2023 33.6  31.5 - 36.5 g/dL Final     RDW 09/19/2023 12.1  10.0 - 15.0 % Final     Platelet Count 09/19/2023 228  150 - 450 10e3/uL Final     Sodium 09/19/2023 140  136 - 145 mmol/L Final     Potassium 09/19/2023 4.4  3.4 - 5.3 mmol/L Final     Chloride 09/19/2023 103  98 - 107 mmol/L Final     Carbon Dioxide (CO2) 09/19/2023 27  22 - 29 mmol/L Final     Anion Gap 09/19/2023 10  7 - 15 mmol/L Final     Urea Nitrogen 09/19/2023 13.6  6.0 - 20.0 mg/dL Final     Creatinine 09/19/2023 0.92  0.67 - 1.17 mg/dL Final     Calcium 09/19/2023 9.5  8.6 - 10.0 mg/dL Final     Glucose 09/19/2023 90  70 - 99 mg/dL Final     Alkaline Phosphatase 09/19/2023 72  40 - 129 U/L Final     AST 09/19/2023 29  0 - 45 U/L Final     ALT 09/19/2023 22  0 - 70 U/L Final     Protein Total 09/19/2023 7.0  6.4 - 8.3 g/dL Final     Albumin 09/19/2023 4.7  3.5 - 5.2 g/dL Final     Bilirubin Total 09/19/2023 0.3  <=1.2 mg/dL Final     GFR Estimate 09/19/2023 >90  >60 mL/min/1.73m2 Final     Hepatitis C Antibody 09/19/2023 Nonreactive  Nonreactive Final     Calprotectin Feces 09/21/2023 17.9  0.0 - 49.9 mg/kg Final        Lab Results   Component Value Date    WBC 7.3 09/19/2023    HGB 16.2 09/19/2023     09/19/2023    CHOL 124 09/19/2023    TRIG 187 (H) 09/19/2023    HDL 35 (L) 09/19/2023    ALT 22 09/19/2023    AST 29 09/19/2023     09/19/2023    BUN 13.6 09/19/2023    CO2 27 09/19/2023        Liver Function Studies -   Recent Labs   Lab Test 09/19/23  1523   PROTTOTAL 7.0   ALBUMIN 4.7   BILITOTAL 0.3    ALKPHOS 72   AST 29   ALT 22        PREVIOUS ENDOSCOPY    No results found for this or any previous visit.    ASSESSMENT/PLAN:  Jose Manuel Johnson is a 23 year old year old male with  23 year old year old male with PMHx of anxiety/depression, migraines, lactose intolerance presenting to establish care with the [facility] GI group for chronic diarrhea x a few years that worsened in last 6 months for unclear reasons. Triggers include stress and dairy. 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 He has had recent labs drawn including normal CRP/ESR, CBC, CMP and normal fecal calpro. He presents today for further eval of this diarrhea.     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 Consider infectious diarrhea given exposure to c.diff and 2 course of abx, malabsorption (c.diff, SIBO), hyperthyroidism, med induced vs other. Less likely inflammatory diarrhea given recent normal fecal calpro, ESR/CRP but will consider in DDX.   -hemodynamically stable with recent normal renal function and electrolytes. Maintaining oral intake.   -check infectious stool panels - c diff, enteric, ova and parasite   -check TTG Ab with total IgA (he is eating gluten)  -check TSH to help guide care   -continue daily metamucil + dietary fiber, per AVS   -Paxil is new in last month with listed GI s/e to include diarrhea and constipation though he denies worsening of diarrhea w/ start of med   >10% - Gastrointestinal: Constipation (2% to 16%), decreased appetite (1% to 12%), diarrhea (6% to 18%), dyspepsia (2% to 13%), nausea (17% to 26%), xerostomia (3% to 18%)   1-10% - Gastrointestinal: Abdominal pain (4% to 7%), dysgeusia (2%), flatulence (4% to 6%), increased appetite (2% to 4%), nausea and vomiting (4%), vomiting (2% to 3%; more frequent in children [two- to threefold] and adolescents) (Safer 2006)     Future consideration  1. More testing would include breath  test for SIBO, fecal elastase   2. Consider scheduled imodium trial if infectious stool studies are neg  3. IB Gita for sx flare  4. GI RD consult for low fodmap diet and retrial   5. Consider [facility] referral  6. If attempts to manage diarrhea are not fully successful, consider c-scope with random colon biopsies       Orders Placed This Encounter   Procedures     TSH with free T4 reflex     Standing Status:   Future     Number of Occurrences:   1     Standing Expiration Date:   10/5/2024     Tissue transglutaminase nieves IgA and IgG     Standing Status:   Future     Number of Occurrences:   1     Standing Expiration Date:   10/5/2024     IgA     Standing Status:   Future     Number of Occurrences:   1     Standing Expiration Date:   10/5/2024     Enteric Bacteria and Virus Panel by BHANU Stool     Standing Status:   Future     Number of Occurrences:   1     Standing Expiration Date:   10/5/2024     Order Specific Question:   Has patient been hospitalized for greater than 3 days?     Answer:   No     Ova and Parasite Exam Routine     Standing Status:   Future     Number of Occurrences:   1     Standing Expiration Date:   10/5/2024     Order Specific Question:   Consider ordering the Enteric Pathogen and Virus Panel PCR ([id number]), unless one of the following apply (select one):     Answer:   Chronic Diarrhea (>2 weeks) with negative PCR ([id number])     C. difficile Toxin B PCR with reflex to C. difficile Antigen and Toxins A/B EIA     Standing Status:   Future     Number of Occurrences:   1     Standing Expiration Date:   11/4/2023     Colorectal cancer screening: aged 45, unless otherwise indicated     RTC 4-6 weeks or sooner if needed     Thank you for this consultation.  It was a pleasure to participate in the care of this patient; please contact me with any further questions.     Berna Martin PA-C    Follow up: As planned above. Today, I personally spent 40 minutes in direct face to face time with the patient, of  which greater than 50% of the time was spent in patient education and counseling as described above. Approximately 20 minutes were spent on indirect care associated with the patient's consultation including but not limited to review of: patient medical records to date, clinic visits, hospital records, lab results, imaging studies, procedural documentation, and coordinating care with other providers. The findings from this review are summarized in the above note. All of the above accounted for a cumulative time of 60 minutes and was performed on the date of service.

## 2023-10-06 ENCOUNTER — APPOINTMENT (OUTPATIENT)
Dept: LAB | Facility: CLINIC | Age: 23
End: 2023-10-06
Payer: COMMERCIAL

## 2023-10-06 LAB
ADV 40+41 DNA STL QL NAA+NON-PROBE: NEGATIVE
ASTRO TYP 1-8 RNA STL QL NAA+NON-PROBE: NEGATIVE
C CAYETANENSIS DNA STL QL NAA+NON-PROBE: NEGATIVE
CAMPYLOBACTER DNA SPEC NAA+PROBE: NEGATIVE
CRYPTOSP DNA STL QL NAA+NON-PROBE: NEGATIVE
E COLI O157 DNA STL QL NAA+NON-PROBE: NORMAL
E HISTOLYT DNA STL QL NAA+NON-PROBE: NEGATIVE
EAEC ASTA GENE ISLT QL NAA+PROBE: NEGATIVE
EC STX1+STX2 GENES STL QL NAA+NON-PROBE: NEGATIVE
EPEC EAE GENE STL QL NAA+NON-PROBE: NEGATIVE
ETEC LTA+ST1A+ST1B TOX ST NAA+NON-PROBE: NEGATIVE
G LAMBLIA DNA STL QL NAA+NON-PROBE: NEGATIVE
IGA SERPL-MCNC: 165 MG/DL (ref 84–499)
NOROVIRUS GI+II RNA STL QL NAA+NON-PROBE: NEGATIVE
P SHIGELLOIDES DNA STL QL NAA+NON-PROBE: NEGATIVE
RVA RNA STL QL NAA+NON-PROBE: NEGATIVE
SALMONELLA SP RPOD STL QL NAA+PROBE: NEGATIVE
SAPO I+II+IV+V RNA STL QL NAA+NON-PROBE: NEGATIVE
SHIGELLA SP+EIEC IPAH ST NAA+NON-PROBE: NEGATIVE
TTG IGA SER-ACNC: 0.7 U/ML
TTG IGG SER-ACNC: <0.6 U/ML
V CHOLERAE DNA SPEC QL NAA+PROBE: NEGATIVE
VIBRIO DNA SPEC NAA+PROBE: NEGATIVE
Y ENTEROCOL DNA STL QL NAA+PROBE: NEGATIVE

## 2023-10-06 PROCEDURE — 99000 SPECIMEN HANDLING OFFICE-LAB: CPT | Performed by: PATHOLOGY

## 2023-10-06 PROCEDURE — 87209 SMEAR COMPLEX STAIN: CPT | Performed by: PHYSICIAN ASSISTANT

## 2023-10-09 LAB — O+P STL MICRO: NEGATIVE

## 2023-10-11 ASSESSMENT — ANXIETY QUESTIONNAIRES
GAD7 TOTAL SCORE: 2
1. FEELING NERVOUS, ANXIOUS, OR ON EDGE: NOT AT ALL
4. TROUBLE RELAXING: NOT AT ALL
3. WORRYING TOO MUCH ABOUT DIFFERENT THINGS: SEVERAL DAYS
2. NOT BEING ABLE TO STOP OR CONTROL WORRYING: NOT AT ALL
IF YOU CHECKED OFF ANY PROBLEMS ON THIS QUESTIONNAIRE, HOW DIFFICULT HAVE THESE PROBLEMS MADE IT FOR YOU TO DO YOUR WORK, TAKE CARE OF THINGS AT HOME, OR GET ALONG WITH OTHER PEOPLE: NOT DIFFICULT AT ALL
7. FEELING AFRAID AS IF SOMETHING AWFUL MIGHT HAPPEN: NOT AT ALL
5. BEING SO RESTLESS THAT IT IS HARD TO SIT STILL: NOT AT ALL
GAD7 TOTAL SCORE: 2
6. BECOMING EASILY ANNOYED OR IRRITABLE: SEVERAL DAYS

## 2023-10-18 ENCOUNTER — VIRTUAL VISIT (OUTPATIENT)
Dept: FAMILY MEDICINE | Facility: CLINIC | Age: 23
End: 2023-10-18
Payer: COMMERCIAL

## 2023-10-18 DIAGNOSIS — F41.9 ANXIETY AND DEPRESSION: ICD-10-CM

## 2023-10-18 DIAGNOSIS — F32.A ANXIETY AND DEPRESSION: ICD-10-CM

## 2023-10-18 PROCEDURE — 99213 OFFICE O/P EST LOW 20 MIN: CPT | Mod: VID | Performed by: NURSE PRACTITIONER

## 2023-10-18 RX ORDER — PAROXETINE 10 MG/1
20 TABLET, FILM COATED ORAL EVERY MORNING
Qty: 180 TABLET | Refills: 1 | Status: SHIPPED | OUTPATIENT
Start: 2023-10-18 | End: 2023-10-18

## 2023-10-18 RX ORDER — PAROXETINE 10 MG/1
20 TABLET, FILM COATED ORAL EVERY MORNING
Qty: 60 TABLET | Refills: 4 | Status: SHIPPED | OUTPATIENT
Start: 2023-10-18 | End: 2023-12-11 | Stop reason: ALTCHOICE

## 2023-10-18 NOTE — PROGRESS NOTES
Jose Manuel is a 23 year old who is being evaluated via a billable video visit.      How would you like to obtain your AVS? MyChart  If the video visit is dropped, the invitation should be resent by: Send to e-mail at: [email or web address]  Will anyone else be joining your video visit? No          Assessment & Plan     Anxiety and depression  Patient feels that anxiety and depression symptoms are much improved since starting Paxil. He is experiencing mild drowsiness and feeling gassy but wants to continue medication. Given side effects, recommended to try taking the medication at HS. He is not experiencing any insomnia symptoms and will try to take the mediation at HS. Another option is trying cutting back to Paxil 10mg daily. Patient will try to take the medication at HS and see if that helps with side effects. He will update me via my-chart about progression of symptoms. If tolerating mediation well, then follow up in 3 months.  Continue psychotherapy once a week  - PARoxetine (PAXIL) 10 MG tablet  Dispense: 60 tablet; Refill: 4                   JAMI Colmenares CNP [facility]   Jose Manuel is a 23 year old, presenting for the following health issues:  Follow Up (Medication follow up )  Anxiety and Depression.       9/19/2023     1:56 PM   Additional Questions   Roomed by Vero PIKE     HPI  Started with Paxil 10mg daily 19/19/23 - now taking Paxil 20mg daily. Feels anxiety and depression symptoms are much improved. The first week was very nauseous and feeling drowsy mostly in the afternoon. Still having an upset stomach (gas) but not as bad.   Not exercising as frequently because the nausea interfered with is activities. Now nausea is resolved and will re-start exercise routine. He is sleeping well. No worsening diarrhea- Saw GI and has a follow up with them for possible IBS.     Participating in therapy every week   No SI or thoughts of hurting others     History of  Present Illness       Mental Health Follow-up:  Patient presents to follow-up on Depression & Anxiety.Patient's depression since last visit has been:  Good  The patient is not having other symptoms associated with depression.  Patient's anxiety since last visit has been:  Good  The patient is not having other symptoms associated with anxiety.  Any significant life events: No  Patient is not feeling anxious or having panic attacks.  Patient has no concerns about alcohol or drug use.    He eats 0-1 servings of fruits and vegetables daily.He consumes 1 sweetened beverage(s) daily.He exercises with enough effort to increase his heart rate 30 to 60 minutes per day.  He exercises with enough effort to increase his heart rate 4 days per week.   He is taking medications regularly.               Review of Systems         Objective           Vitals:  No vitals were obtained today due to virtual visit.    Physical Exam   GENERAL: Healthy, alert and no distress  EYES: Eyes grossly normal to inspection.  No discharge or erythema, or obvious scleral/conjunctival abnormalities.  RESP: No audible wheeze, cough, or visible cyanosis.  No visible retractions or increased work of breathing.    SKIN: Visible skin clear. No significant rash, abnormal pigmentation or lesions.  NEURO: Cranial nerves grossly intact.  Mentation and speech appropriate for age.  PSYCH: Mentation appears normal, affect normal/bright, judgement and insight intact, normal speech and appearance well-groomed.                Video-Visit Details    Type of service:  Video Visit     Originating Location (pt. Location): Home    Distant Location (provider location):  On-site  Platform used for Video Visit: [email or web address]

## 2023-10-25 ENCOUNTER — MYC MEDICAL ADVICE (OUTPATIENT)
Dept: GASTROENTEROLOGY | Facility: CLINIC | Age: 23
End: 2023-10-25
Payer: COMMERCIAL

## 2023-10-25 DIAGNOSIS — K52.9 CHRONIC DIARRHEA: ICD-10-CM

## 2023-10-25 DIAGNOSIS — R14.0 BLOATING: Primary | ICD-10-CM

## 2023-10-26 RX ORDER — DICYCLOMINE HYDROCHLORIDE 10 MG/1
10 CAPSULE ORAL 2 TIMES DAILY PRN
Qty: 30 CAPSULE | Refills: 0 | Status: SHIPPED | OUTPATIENT
Start: 2023-10-26 | End: 2023-11-13

## 2023-11-08 DIAGNOSIS — K52.9 CHRONIC DIARRHEA: ICD-10-CM

## 2023-11-08 DIAGNOSIS — R14.0 BLOATING: ICD-10-CM

## 2023-11-08 RX ORDER — DICYCLOMINE HYDROCHLORIDE 10 MG/1
10 CAPSULE ORAL 2 TIMES DAILY PRN
Qty: 180 CAPSULE | Refills: 0 | OUTPATIENT
Start: 2023-11-08

## 2023-11-13 ENCOUNTER — VIRTUAL VISIT (OUTPATIENT)
Dept: GASTROENTEROLOGY | Facility: CLINIC | Age: 23
End: 2023-11-13
Attending: PHYSICIAN ASSISTANT
Payer: COMMERCIAL

## 2023-11-13 VITALS — BODY MASS INDEX: 26.96 KG/M2 | WEIGHT: 182 LBS | HEIGHT: 69 IN

## 2023-11-13 DIAGNOSIS — R14.0 BLOATING: Primary | ICD-10-CM

## 2023-11-13 DIAGNOSIS — R10.30 LOWER ABDOMINAL PAIN, UNSPECIFIED: ICD-10-CM

## 2023-11-13 DIAGNOSIS — R19.5 CHANGE IN CONSISTENCY OF STOOL: ICD-10-CM

## 2023-11-13 DIAGNOSIS — R12 HEARTBURN: ICD-10-CM

## 2023-11-13 PROCEDURE — 99215 OFFICE O/P EST HI 40 MIN: CPT | Mod: VID | Performed by: PHYSICIAN ASSISTANT

## 2023-11-13 RX ORDER — DICYCLOMINE HYDROCHLORIDE 10 MG/1
10 CAPSULE ORAL 2 TIMES DAILY PRN
Qty: 30 CAPSULE | Refills: 5 | Status: SHIPPED | OUTPATIENT
Start: 2023-11-13 | End: 2024-05-16

## 2023-11-13 ASSESSMENT — ANXIETY QUESTIONNAIRES
1. FEELING NERVOUS, ANXIOUS, OR ON EDGE: NOT AT ALL
5. BEING SO RESTLESS THAT IT IS HARD TO SIT STILL: SEVERAL DAYS
4. TROUBLE RELAXING: NOT AT ALL
6. BECOMING EASILY ANNOYED OR IRRITABLE: NOT AT ALL
IF YOU CHECKED OFF ANY PROBLEMS ON THIS QUESTIONNAIRE, HOW DIFFICULT HAVE THESE PROBLEMS MADE IT FOR YOU TO DO YOUR WORK, TAKE CARE OF THINGS AT HOME, OR GET ALONG WITH OTHER PEOPLE: NOT DIFFICULT AT ALL
GAD7 TOTAL SCORE: 1
3. WORRYING TOO MUCH ABOUT DIFFERENT THINGS: NOT AT ALL
7. FEELING AFRAID AS IF SOMETHING AWFUL MIGHT HAPPEN: NOT AT ALL
GAD7 TOTAL SCORE: 1
2. NOT BEING ABLE TO STOP OR CONTROL WORRYING: NOT AT ALL

## 2023-11-13 ASSESSMENT — PAIN SCALES - GENERAL: PAINLEVEL: NO PAIN (0)

## 2023-11-13 NOTE — PROGRESS NOTES
"Virtual Visit Details    Type of service:  Video Visit     Originating Location (pt. Location): Home in [place]     Distant Location (provider location):  Off-site  Platform used for Video Visit: Rosario       Joined the call at 11/13/2023, 8:02:13 am.  Left the call at 11/13/2023, 8:21:44 am.  You were on the call for 19 minutes 30 seconds .        GI CLINIC VISIT    Jose Manuel Johnson is a 23 year old year old male with PMHx of anxiety/depression, migraines, lactose intolerance following with the GI group for worsening chronic diarrhea. Historically he reports diarrhea that flared with increased anxiety/stress. Prior work up included normal inflammatory markers (ESR/CRP and fecal calpro), and normal CBC and CMP. He established with [facility] 9[facility] and is here today for short term follow-up     GI work up   -WNL TSH  -NEG TTG Ab with intact total IgA  -NEG enteric panel and Ova and Parasite  -Rejected C.diff, formed stools  -pending SIBO test on 11/28  -Given Bentyl for abd pain and asked to increase metamucil     Interval History - working with primary to switch paxil to another med, on 10 mg dose now     Today, 11/13  1. Feeing overall OK . Today he feels a lot his GI sx are related to use of Paxil. Reports painful bloat with dose increase with associated looser stools. He is down to 10 mg now and might consider starting lexapro but will discuss it with primary (sis on lexapro). Feels that it \"sours (his) stomach.\" Also he is having some increased belching and heartburn sensation w/o dysphagia or odynphagia on med. He tried taking paxil at night but got nauseated and vomited. It was a one time event. No further n/v episodes. It has been helpful for his mood.     2. He has started bentyl, taking BID, and feels it helps \"settle (his) stomach.\" Gets abdominal cramping which med helps with. Otherwise will have 1-2 days of the week where he wants to lay down due to discomfort in abdomen.   Regarding bowel regiment, he is taking " "metamucil 2 tsp in AM (6 g fiber) + metamucil 5 caps at night (2g fiber).   On this regiment, he has stools that are very soft but overall keep their shape and form in the toilet bowl water. He used to have straight liquid stools. Seems they are of a thinner caliber. He describes stools as \"like a gummy worm.\" Still reporting fecal urgency. No blood or black stools. No nocturnal stooling.   No fhx CRC. Weight stable.     3. Requesting rx refill on bentyl     [place] will [place] out of [place] 12[place]18[place]May 2024 in [place]. He does have medical coverage in [place] as well. No other concerns     ROS No f/c/n/v/dysphagia/odynphagia. Other ROS per HPI.     --10/5/23 ---  -Reports he has always had a \"nervous stomach\" that flared in pain associated with increased freq of stool and change in stooling (more loose) around stressful events (tests at school).   Does have lactose intolerance which is managed with ingestion of lactaid.     -In last 1-2 years, this diarrhea pattern worsened where before it was intermittent, he was then having daily episodes of loose stools 1-2x/d of BSC type 4-5.     -In last 6 mo, diarrhea patterned worsened in both consistency and frequency prompting him to take daily pepto in order to manage it.  His typical pattern in past 6 mo is the following -- after breakfast, he will have 3 coupled BM described as BSC type 6 then take 1 tabs of pepto bismol. He will then not have any diarrhea or BMs after the pepto. Stooling associated with sensation of tenesmus, gas and passage of mucus.     +Infrequent heartburn maybe 1x every few weeks.   No dysphagia/odynphagia.   +Bloat after starting paxil 1 mo ago. Denies worsening of diarrhea w/ start of paxil.   In July had 2-3d of bad abd pain that self-resolved     Exposure   -Feb 2023 he lived in a NH for 2.5 months. 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   -He is currently working in another NH in [facility].     Abx   -Feb 2023 a " course of amoxil for sinus infection   -July/Aug 2023 - dental surgery with unknown abx     He started taking probiotics 24 billion CFU, metamucil 2 tsp daily which he feels has helped.   Never required UC/ER eval for fluid replacement / dehydration.     Overall feels his mental health has improved in last 2 years yet diarrhea is worsening.     Weight stable  Appetite stable     Diet Recall  B - honey nut cherrios / cereal  L - ham sandwich with guillaume or PB sandwich, hummus/franny, fruit  D - protein, veggies, carbs, salad whatever  cooks  Snacking - [place] almond butter, banana / fruit     ROS  Denies fevers, chills, weight loss, nausea, emesis, dysphagia, odynophagia, regurgitation,current  abdominal pain,  black tarrying stools, bloody stools, rectal pain, rectal fullness, rectal itching.     Family Hx  Sister with IBS   Dad with 'nervous stomach'  PGM - with 'nervous stomach' hypervigilant when outdoors, mapping location of toilets   Routine colonscopy for both parents, both with polyps (unknown type)      No other known family history or GI related malignancy (esophageal, gastric, pancreatic, liver or colon) or family history of IBD/celiac disease.     Social Hx   Student studying to become a [employer]; in year 2 of 11 year program     Will take Ibuprofen 400 mg, or APAP 1gram once weekly for headaches   No use of OTC herbal supplements/weight loss products.      No ETOH  Never tobacco products.   No recreational drug use.     PROBLEM LIST  Patient Active Problem List    Diagnosis Date Noted     Anxiety and depression 09/20/2023     Priority: Medium     Migraine with aura and without status migrainosus, not intractable 09/19/2023     Priority: Medium     Chronic diarrhea 09/19/2023     Priority: Medium       PERTINENT PAST MEDICAL HISTORY:  No past medical history on file.    PREVIOUS SURGERIES:  No past surgical history on file.    ALLERGIES:   No Known Allergies    PERTINENT  MEDICATIONS:    Current Outpatient Medications:      cetirizine (ZYRTEC) 5 MG tablet, Take 5 mg by mouth daily, Disp: , Rfl:      dicyclomine (BENTYL) 10 MG capsule, Take 1 capsule (10 mg) by mouth 2 times daily as needed (abdominal cramping), Disp: 30 capsule, Rfl: 0     lactobacillus rhamnosus, GG, (CULTURELL) capsule, Take 1 capsule by mouth 2 times daily, Disp: , Rfl:      PARoxetine (PAXIL) 10 MG tablet, Take 2 tablets (20 mg) by mouth every morning, Disp: 60 tablet, Rfl: 4     psyllium (METAMUCIL) 58.6 % packet, Take 1 packet by mouth daily, Disp: , Rfl:     SOCIAL HISTORY:  Social History     Socioeconomic History     Marital status: Single     Spouse name: Not on file     Number of children: Not on file     Years of education: Not on file     Highest education level: Not on file   Occupational History     Not on file   Tobacco Use     Smoking status: Never     Smokeless tobacco: Never   Vaping Use     Vaping Use: Never used   Substance and Sexual Activity     Alcohol use: Not on file     Drug use: Not on file     Sexual activity: Not on file   Other Topics Concern     Not on file   Social History Narrative     Not on file     Social Determinants of Health     Financial Resource Strain: Low Risk  (10/11/2023)    Financial Resource Strain      Within the past 12 months, have you or your family members you live with been unable to get utilities (heat, electricity) when it was really needed?: No   Food Insecurity: Low Risk  (10/11/2023)    Food Insecurity      Within the past 12 months, did you worry that your food would run out before you got money to buy more?: No      Within the past 12 months, did the food you bought just not last and you didn t have money to get more?: No   Transportation Needs: Low Risk  (10/11/2023)    Transportation Needs      Within the past 12 months, has lack of transportation kept you from medical appointments, getting your medicines, non-medical meetings or appointments, work, or  "from getting things that you need?: No   Physical Activity: Not on file   Stress: Not on file   Social Connections: Not on file   Interpersonal Safety: Not on file   Housing Stability: Low Risk  (10/11/2023)    Housing Stability      Do you have housing? : Yes      Are you worried about losing your housing?: No       FAMILY HISTORY:  No family history on file.    Past/family/social history reviewed and no changes    PHYSICAL EXAMINATION:  Vitals reviewed: Ht 1.753 m (5' 9\")   Wt 82.6 kg (182 lb)   BMI 26.88 kg/m    Wt:   Wt Readings from Last 2 Encounters:   11/13/23 82.6 kg (182 lb)   10/05/23 83.5 kg (184 lb 1.6 oz)   Video physical exam  General: Patient appears well in no acute distress.   Skin: No visualized rash or lesions on visualized skin  Eyes: EOMI, no erythema, sclera icterus or discharge noted  Resp: Appears to be breathing comfortably without accessory muscle usage, speaking in full sentences, no cough  MSK: Appears to have normal range of motion based on visualized movements  Neurologic: No apparent tremors, facial movements symmetric  Psych: affect normal, alert and oriented    PERTINENT STUDIES:    Office Visit on 09/19/2023   Component Date Value Ref Range Status     Cholesterol 09/19/2023 124  <200 mg/dL Final     Triglycerides 09/19/2023 187 (H)  <150 mg/dL Final     Direct Measure HDL 09/19/2023 35 (L)  >=40 mg/dL Final     LDL Cholesterol Calculated 09/19/2023 52  <=100 mg/dL Final     Non HDL Cholesterol 09/19/2023 89  <130 mg/dL Final     Erythrocyte Sedimentation Rate 09/19/2023 2  0 - 15 mm/hr Final     CRP Inflammation 09/19/2023 <3.00  <5.00 mg/L Final     WBC Count 09/19/2023 7.3  4.0 - 11.0 10e3/uL Final     RBC Count 09/19/2023 5.35  4.40 - 5.90 10e6/uL Final     Hemoglobin 09/19/2023 16.2  13.3 - 17.7 g/dL Final     Hematocrit 09/19/2023 48.2  40.0 - 53.0 % Final     MCV 09/19/2023 90  78 - 100 fL Final     MCH 09/19/2023 30.3  26.5 - 33.0 pg Final     MCHC 09/19/2023 33.6  31.5 - " 36.5 g/dL Final     RDW 09/19/2023 12.1  10.0 - 15.0 % Final     Platelet Count 09/19/2023 228  150 - 450 10e3/uL Final     Sodium 09/19/2023 140  136 - 145 mmol/L Final     Potassium 09/19/2023 4.4  3.4 - 5.3 mmol/L Final     Chloride 09/19/2023 103  98 - 107 mmol/L Final     Carbon Dioxide (CO2) 09/19/2023 27  22 - 29 mmol/L Final     Anion Gap 09/19/2023 10  7 - 15 mmol/L Final     Urea Nitrogen 09/19/2023 13.6  6.0 - 20.0 mg/dL Final     Creatinine 09/19/2023 0.92  0.67 - 1.17 mg/dL Final     Calcium 09/19/2023 9.5  8.6 - 10.0 mg/dL Final     Glucose 09/19/2023 90  70 - 99 mg/dL Final     Alkaline Phosphatase 09/19/2023 72  40 - 129 U/L Final     AST 09/19/2023 29  0 - 45 U/L Final     ALT 09/19/2023 22  0 - 70 U/L Final     Protein Total 09/19/2023 7.0  6.4 - 8.3 g/dL Final     Albumin 09/19/2023 4.7  3.5 - 5.2 g/dL Final     Bilirubin Total 09/19/2023 0.3  <=1.2 mg/dL Final     GFR Estimate 09/19/2023 >90  >60 mL/min/1.73m2 Final     Hepatitis C Antibody 09/19/2023 Nonreactive  Nonreactive Final     Calprotectin Feces 09/21/2023 17.9  0.0 - 49.9 mg/kg Final        Lab Results   Component Value Date    WBC 7.3 09/19/2023    HGB 16.2 09/19/2023     09/19/2023    CHOL 124 09/19/2023    TRIG 187 (H) 09/19/2023    HDL 35 (L) 09/19/2023    ALT 22 09/19/2023    AST 29 09/19/2023     09/19/2023    BUN 13.6 09/19/2023    CO2 27 09/19/2023    TSH 1.14 10/05/2023        Liver Function Studies -   Recent Labs   Lab Test 09/19/23  1523   PROTTOTAL 7.0   ALBUMIN 4.7   BILITOTAL 0.3   ALKPHOS 72   AST 29   ALT 22        PREVIOUS ENDOSCOPY    No results found for this or any previous visit.    ASSESSMENT/PLAN:  Jose Manuel Johnson is a 23 year old year old male with  23 year old year old male with PMHx of anxiety/depression, migraines, lactose intolerance following with [facility] for chronic diarrhea that worsened in last 6 months for unclear reasons. Triggers include stress and dairy.     Work up thus  far  -normal CRP/ESR, fecal calpro  -normal CBC, CMP, TSH  -neg Infectious stool studies (enteric panel, O&P)  -c.diff rejected due to formed stools  -celiac serologies NEG with intact total IgA on gluten containing diet   -SIBO eval scheduled 11/28     #Chronic diarrhea   #lactose intolerance   Sx suggestive of DGBI and perhaps dysbiosis secondary to abx use. DDX - malabsorption (SIBO, EPI vs other carb intolerance), med induced GI changes, vs other. Less likely IBD given normal inflammatory markers and absence of red flag sx but keep in DDX (moreso microscopic colitis)     Overall sx are improved with metamucil and bentyl. Cont fiber and Rx bentyl. Asked to take lowest effective dose of bentyl.   -cont working with primary to change selective serotonin reuptake inhibitor, we will follow along. Advised to avoid Zoloft, if possible.   -infectious stool panels -enteric, ova and parasite NEG. C.diff rejected due to formed stool. Add on H.pylori for episodes of heartburn (intermittent).   -add on fecal elastase for eval of EPI  -try Gas-X for bloat and gas   -await hydrogen breath test scheduled for 11/28/23     Future consideration  1. Consider imodium trial (PRN vs scheduled) if having breakthrough diarrhea   2. IB Gita for sx flare  3. GI RD consult for low fodmap diet and retrial   4. Consider [facility] referral  5. If attempts to manage diarrhea are not fully successful, consider c-scope with random colon biopsies     No orders of the defined types were placed in this encounter.    Colorectal cancer screening: aged 45, unless otherwise indicated     RTC - we discussed follow-up. We agreed on 1 mo with [facility] before he leaves [place] for 6 months. Ordered. If sx are improved, OK to push appt out to when he returns in May 2024.     Thank you for this consultation.  It was a pleasure to participate in the care of this patient; please contact me with any further questions.     Berna Martin PA-C    Follow up: As planned  above. Today, I personally spent 20 minutes in direct face to face time with the patient, of which greater than 50% of the time was spent in patient education and counseling as described above. Approximately 20 minutes were spent on indirect care associated with the patient's consultation including but not limited to review of: patient medical records to date, clinic visits, hospital records, lab results, imaging studies, procedural documentation, and coordinating care with other providers. The findings from this review are summarized in the above note. All of the above accounted for a cumulative time of 40 minutes and was performed on the date of service.

## 2023-11-13 NOTE — LETTER
"    11/13/2023         RE: Jose Manuel Johnson  [address] [place] [address]        Dear Colleague,    Thank you for referring your patient, Jose Manuel Johnson, to the [facility] [place]. Please see a copy of my visit note below.    Joined the call at 11/13/2023, 8:02:13 am.  Left the call at 11/13/2023, 8:21:44 am.  You were on the call for 19 minutes 30 seconds .        GI CLINIC VISIT    Jose Manuel Johnson is a 23 year old year old male with PMHx of anxiety/depression, migraines, lactose intolerance following with the GI group for worsening chronic diarrhea. Historically he reports diarrhea that flared with increased anxiety/stress. Prior work up included normal inflammatory markers (ESR/CRP and fecal calpro), and normal CBC and CMP. He established with [facility] 9/2023 and is here today for short term follow-up     GI work up   -WNL TSH  -NEG TTG Ab with intact total IgA  -NEG enteric panel and Ova and Parasite  -Rejected C.diff, formed stools  -pending SIBO test on 11/28  -Given Bentyl for abd pain and asked to increase metamucil     Interval History - working with primary to switch paxil to another med, on 10 mg dose now     Today, 11/13  1. Feeing overall OK . Today he feels a lot his GI sx are related to use of Paxil. Reports painful bloat with dose increase with associated looser stools. He is down to 10 mg now and might consider starting lexapro but will discuss it with primary (sis on lexapro). Feels that it \"sours (his) stomach.\" Also he is having some increased belching and heartburn sensation w/o dysphagia or odynphagia on med. He tried taking paxil at night but got nauseated and vomited. It was a one time event. No further n/v episodes. It has been helpful for his mood.     2. He has started bentyl, taking BID, and feels it helps \"settle (his) stomach.\" Gets abdominal cramping which med helps with. Otherwise will have 1-2 days of the week where he wants to lay down due " "to discomfort in abdomen.   Regarding bowel regiment, he is taking metamucil 2 tsp in AM (6 g fiber) + metamucil 5 caps at night (2g fiber).   On this regiment, he has stools that are very soft but overall keep their shape and form in the toilet bowl water. He used to have straight liquid stools. Seems they are of a thinner caliber. He describes stools as \"like a gummy worm.\" Still reporting fecal urgency. No blood or black stools. No nocturnal stooling.   No fhx CRC. Weight stable.     3. Requesting rx refill on bentyl     He will be out of state 12/18-May 2024 in [place]. He does have medical coverage in [place] as well. No other concerns     ROS No f/c/n/v/dysphagia/odynphagia. Other ROS per HPI.     --10/5/23 ---  -Reports he has always had a \"nervous stomach\" that flared in pain associated with increased freq of stool and change in stooling (more loose) around stressful events (tests at school).   Does have lactose intolerance which is managed with ingestion of lactaid.     -In last 1-2 years, this diarrhea pattern worsened where before it was intermittent, he was then having daily episodes of loose stools 1-2x/d of BSC type 4-5.     -In last 6 mo, diarrhea patterned worsened in both consistency and frequency prompting him to take daily pepto in order to manage it.  His typical pattern in past 6 mo is the following -- after breakfast, he will have 3 coupled BM described as BSC type 6 then take 1 tabs of pepto bismol. He will then not have any diarrhea or BMs after the pepto. Stooling associated with sensation of tenesmus, gas and passage of mucus.     +Infrequent heartburn maybe 1x every few weeks.   No dysphagia/odynphagia.   +Bloat after starting paxil 1 mo ago. Denies worsening of diarrhea w/ start of paxil.   In July had 2-3d of bad abd pain that self-resolved     Exposure   -Feb 2023 he lived in a NH for 2.5 months. 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   -He is " currently working in another NH in [facility].     Abx   -Feb 2023 a course of amoxil for sinus infection   -July/Aug 2023 - dental surgery with unknown abx     He started taking probiotics 24 billion CFU, metamucil 2 tsp daily which he feels has helped.   Never required UC/ER eval for fluid replacement / dehydration.     Overall feels his mental health has improved in last 2 years yet diarrhea is worsening.     Weight stable  Appetite stable     Diet Recall  B - honey nut cherrios / cereal  L - ham sandwich with guillaume or PB sandwich, hummus/franny, fruit  D - protein, veggies, carbs, salad whatever  cooks  Snacking - nature [place] almond butter, banana / fruit     ROS  Denies fevers, chills, weight loss, nausea, emesis, dysphagia, odynophagia, regurgitation,current  abdominal pain,  black tarrying stools, bloody stools, rectal pain, rectal fullness, rectal itching.     Family Hx  Sister with IBS   Dad with 'nervous stomach'  PGM - with 'nervous stomach' hypervigilant when outdoors, mapping location of toilets   Routine colonscopy for both parents, both with polyps (unknown type)      No other known family history or GI related malignancy (esophageal, gastric, pancreatic, liver or colon) or family history of IBD/celiac disease.     Social Hx   Student studying to become a [employer] ; in year 2 of 11 year program     Will take Ibuprofen 400 mg, or APAP 1gram once weekly for headaches   No use of OTC herbal supplements/weight loss products.      No ETOH  Never tobacco products.   No recreational drug use.     PROBLEM LIST  Patient Active Problem List    Diagnosis Date Noted    Anxiety and depression 09/20/2023     Priority: Medium    Migraine with aura and without status migrainosus, not intractable 09/19/2023     Priority: Medium    Chronic diarrhea 09/19/2023     Priority: Medium       PERTINENT PAST MEDICAL HISTORY:  No past medical history on file.    PREVIOUS SURGERIES:  No past surgical history on  file.    ALLERGIES:   No Known Allergies    PERTINENT MEDICATIONS:    Current Outpatient Medications:     cetirizine (ZYRTEC) 5 MG tablet, Take 5 mg by mouth daily, Disp: , Rfl:     dicyclomine (BENTYL) 10 MG capsule, Take 1 capsule (10 mg) by mouth 2 times daily as needed (abdominal cramping), Disp: 30 capsule, Rfl: 0    lactobacillus rhamnosus, GG, (CULTURELL) capsule, Take 1 capsule by mouth 2 times daily, Disp: , Rfl:     PARoxetine (PAXIL) 10 MG tablet, Take 2 tablets (20 mg) by mouth every morning, Disp: 60 tablet, Rfl: 4    psyllium (METAMUCIL) 58.6 % packet, Take 1 packet by mouth daily, Disp: , Rfl:     SOCIAL HISTORY:  Social History     Socioeconomic History    Marital status: Single     Spouse name: Not on file    Number of children: Not on file    Years of education: Not on file    Highest education level: Not on file   Occupational History    Not on file   Tobacco Use    Smoking status: Never    Smokeless tobacco: Never   Vaping Use    Vaping Use: Never used   Substance and Sexual Activity    Alcohol use: Not on file    Drug use: Not on file    Sexual activity: Not on file   Other Topics Concern    Not on file   Social History Narrative    Not on file     Social Determinants of Health     Financial Resource Strain: Low Risk  (10/11/2023)    Financial Resource Strain     Within the past 12 months, have you or your family members you live with been unable to get utilities (heat, electricity) when it was really needed?: No   Food Insecurity: Low Risk  (10/11/2023)    Food Insecurity     Within the past 12 months, did you worry that your food would run out before you got money to buy more?: No     Within the past 12 months, did the food you bought just not last and you didn t have money to get more?: No   Transportation Needs: Low Risk  (10/11/2023)    Transportation Needs     Within the past 12 months, has lack of transportation kept you from medical appointments, getting your medicines, non-medical  "meetings or appointments, work, or from getting things that you need?: No   Physical Activity: Not on file   Stress: Not on file   Social Connections: Not on file   Interpersonal Safety: Not on file   Housing Stability: Low Risk  (10/11/2023)    Housing Stability     Do you have housing? : Yes     Are you worried about losing your housing?: No       FAMILY HISTORY:  No family history on file.    Past/family/social history reviewed and no changes    PHYSICAL EXAMINATION:  Vitals reviewed: Ht 1.753 m (5' 9\")   Wt 82.6 kg (182 lb)   BMI 26.88 kg/m    Wt:   Wt Readings from Last 2 Encounters:   11/13/23 82.6 kg (182 lb)   10/05/23 83.5 kg (184 lb 1.6 oz)   Video physical exam  General: Patient appears well in no acute distress.   Skin: No visualized rash or lesions on visualized skin  Eyes: EOMI, no erythema, sclera icterus or discharge noted  Resp: Appears to be breathing comfortably without accessory muscle usage, speaking in full sentences, no cough  MSK: Appears to have normal range of motion based on visualized movements  Neurologic: No apparent tremors, facial movements symmetric  Psych: affect normal, alert and oriented    PERTINENT STUDIES:    Office Visit on 09/19/2023   Component Date Value Ref Range Status    Cholesterol 09/19/2023 124  <200 mg/dL Final    Triglycerides 09/19/2023 187 (H)  <150 mg/dL Final    Direct Measure HDL 09/19/2023 35 (L)  >=40 mg/dL Final    LDL Cholesterol Calculated 09/19/2023 52  <=100 mg/dL Final    Non HDL Cholesterol 09/19/2023 89  <130 mg/dL Final    Erythrocyte Sedimentation Rate 09/19/2023 2  0 - 15 mm/hr Final    CRP Inflammation 09/19/2023 <3.00  <5.00 mg/L Final    WBC Count 09/19/2023 7.3  4.0 - 11.0 10e3/uL Final    RBC Count 09/19/2023 5.35  4.40 - 5.90 10e6/uL Final    Hemoglobin 09/19/2023 16.2  13.3 - 17.7 g/dL Final    Hematocrit 09/19/2023 48.2  40.0 - 53.0 % Final    MCV 09/19/2023 90  78 - 100 fL Final    MCH 09/19/2023 30.3  26.5 - 33.0 pg Final    MCHC " 09/19/2023 33.6  31.5 - 36.5 g/dL Final    RDW 09/19/2023 12.1  10.0 - 15.0 % Final    Platelet Count 09/19/2023 228  150 - 450 10e3/uL Final    Sodium 09/19/2023 140  136 - 145 mmol/L Final    Potassium 09/19/2023 4.4  3.4 - 5.3 mmol/L Final    Chloride 09/19/2023 103  98 - 107 mmol/L Final    Carbon Dioxide (CO2) 09/19/2023 27  22 - 29 mmol/L Final    Anion Gap 09/19/2023 10  7 - 15 mmol/L Final    Urea Nitrogen 09/19/2023 13.6  6.0 - 20.0 mg/dL Final    Creatinine 09/19/2023 0.92  0.67 - 1.17 mg/dL Final    Calcium 09/19/2023 9.5  8.6 - 10.0 mg/dL Final    Glucose 09/19/2023 90  70 - 99 mg/dL Final    Alkaline Phosphatase 09/19/2023 72  40 - 129 U/L Final    AST 09/19/2023 29  0 - 45 U/L Final    ALT 09/19/2023 22  0 - 70 U/L Final    Protein Total 09/19/2023 7.0  6.4 - 8.3 g/dL Final    Albumin 09/19/2023 4.7  3.5 - 5.2 g/dL Final    Bilirubin Total 09/19/2023 0.3  <=1.2 mg/dL Final    GFR Estimate 09/19/2023 >90  >60 mL/min/1.73m2 Final    Hepatitis C Antibody 09/19/2023 Nonreactive  Nonreactive Final    Calprotectin Feces 09/21/2023 17.9  0.0 - 49.9 mg/kg Final        Lab Results   Component Value Date    WBC 7.3 09/19/2023    HGB 16.2 09/19/2023     09/19/2023    CHOL 124 09/19/2023    TRIG 187 (H) 09/19/2023    HDL 35 (L) 09/19/2023    ALT 22 09/19/2023    AST 29 09/19/2023     09/19/2023    BUN 13.6 09/19/2023    CO2 27 09/19/2023    TSH 1.14 10/05/2023        Liver Function Studies -   Recent Labs   Lab Test 09/19/23  1523   PROTTOTAL 7.0   ALBUMIN 4.7   BILITOTAL 0.3   ALKPHOS 72   AST 29   ALT 22        PREVIOUS ENDOSCOPY    No results found for this or any previous visit.    ASSESSMENT/PLAN:  Jose Manuel Johnson is a 23 year old year old male with  23 year old year old male with PMHx of anxiety/depression, migraines, lactose intolerance following with [facility] for chronic diarrhea that worsened in last 6 months for unclear reasons. Triggers include stress and dairy.     Work up thus  far  -normal CRP/ESR, fecal calpro  -normal CBC, CMP, TSH  -neg Infectious stool studies (enteric panel, O&P)  -c.diff rejected due to formed stools  -celiac serologies NEG with intact total IgA on gluten containing diet   -SIBO eval scheduled 11/28     #Chronic diarrhea   #lactose intolerance   Sx suggestive of DGBI and perhaps dysbiosis secondary to abx use. DDX - malabsorption (SIBO, EPI vs other carb intolerance), med induced GI changes, vs other. Less likely IBD given normal inflammatory markers and absence of red flag sx but keep in DDX (moreso microscopic colitis)     Overall sx are improved with metamucil and bentyl. Cont fiber and Rx bentyl. Asked to take lowest effective dose of bentyl.   -cont working with primary to change selective serotonin reuptake inhibitor, we will follow along. Advised to avoid Zoloft, if possible.   -infectious stool panels -enteric, ova and parasite NEG. C.diff rejected due to formed stool. Add on H.pylori for episodes of heartburn (intermittent).   -add on fecal elastase for eval of EPI  -try Gas-X for bloat and gas   -await hydrogen breath test scheduled for 11/28/23     Future consideration  1. Consider imodium trial (PRN vs scheduled) if having breakthrough diarrhea   2. IB Gita for sx flare  3. GI RD consult for low fodmap diet and retrial   4. Consider [facility] referral  5. If attempts to manage diarrhea are not fully successful, consider c-scope with random colon biopsies     No orders of the defined types were placed in this encounter.    Colorectal cancer screening: aged 45, unless otherwise indicated     RTC - we discussed follow-up. We agreed on 1 mo with [facility] before he leaves [place] for 6 months. Ordered. If sx are improved, OK to push appt out to when he returns in May 2024.     Thank you for this consultation.  It was a pleasure to participate in the care of this patient; please contact me with any further questions.     Follow up: As planned above. Today, I  personally spent 20 minutes in direct face to face time with the patient, of which greater than 50% of the time was spent in patient education and counseling as described above. Approximately 20 minutes were spent on indirect care associated with the patient's consultation including but not limited to review of: patient medical records to date, clinic visits, hospital records, lab results, imaging studies, procedural documentation, and coordinating care with other providers. The findings from this review are summarized in the above note. All of the above accounted for a cumulative time of 40 minutes and was performed on the date of service.       Again, thank you for allowing me to participate in the care of your patient.      Sincerely,    Berna Martin PA-C

## 2023-11-13 NOTE — NURSING NOTE
Is the patient currently in the state of [place]? YES    Visit mode:VIDEO    If the visit is dropped, the patient can be reconnected by: VIDEO VISIT: Send to e-mail at: [email or web address]    Will anyone else be joining the visit? NO  (If patient encounters technical issues they should call [phone]    How would you like to obtain your AVS? MyChart    Are changes needed to the allergy or medication list? No    Reason for visit: TYREE SILVA

## 2023-11-13 NOTE — PATIENT INSTRUCTIONS
It was a pleasure taking care of you today.  I've included a brief summary of our discussion and care plan from today's visit below.  Please review this information with your primary care provider.  _______________________________________________________________________    My recommendations are summarized as follows:    Stool testing for h.pylori. Added on an  stool test to check for digestive function of your pancreas as well.   Will send for bentyl to pharmacy   Will be in touch once I get small intestinal bacterial breath test back   Cont working with primary on selective serotonin reuptake inhibitor. Avoid zoloft or sertraline if possible   I think it would be worthwhile for you to try Gas-X to see if that helps with the bloat. It is over the counter   OK to see you in 1 mo time. If you're otherwise doing OK, I am OK to see you when you get back in May as well. Keep us updated, regardless (thank you)     Please call our clinic or send a Mobifusiont message to us if you have any questions or concerns. Portable Internethart messages are answered by your nurse or doctor typically within 24 hours.  Please allow extra time on weekends and holidays    Return to GI Clinic in 1-6 months to review your progress.    _______________________________________________________________________    How do I schedule labs, imaging studies, or procedures that were ordered in clinic today?      Labs: To schedule lab appointment at the [facility], use my chart or call [phone]. If you have a [place] lab closer to home where you are regularly seen you can give them a call.      Procedures: If a colonoscopy, upper endoscopy, breath test, esophageal manometry, or pH impedence was ordered today, our endoscopy team will call you to schedule this. If you have not heard from our endoscopy team within a week, please call [phone] option 2 to schedule.      Imaging Studies: If you were scheduled for a CT scan, X-ray, MRI, ultrasound,  HIDA scan or other imaging study, please call [phone] to have this scheduled.      Referral: If a referral to another specialty was ordered, expect a phone call or follow instructions above. If you have not heard from anyone regarding your referral in a week, please call our clinic to check the status.      Who do I call with any questions after my visit?  Please be in touch if there are any further questions that arise following today's visit.  There are multiple ways to contact your gastroenterology care team.       During business hours, you may reach a Gastroenterology nurse at [phone]     To schedule or reschedule an appointment, please call [phone].      You can always send a secure message through ki work.  ki work messages are answered by your nurse or doctor typically within 24 hours.  Please allow extra time on weekends and holidays.       For urgent/emergent questions after business hours, you may reach the on-call GI Fellow by contacting the [facility]  at [phone].     How will I get the results of any tests ordered?    You will receive all of your results.  If you have signed up for EventBuilder[employer], any tests ordered at your visit will be available to you after your physician reviews them.  Typically this takes 1-2 weeks.  If there are urgent results that require a change in your care plan, your physician or nurse will call you to discuss the next steps.       What is ki work?  ki work is a secure way for you to access all of your healthcare records from the [facility].  It is a web based computer program, so you can sign on to it from any location.  It also allows you to send secure messages to your care team.  I recommend signing up for ki work access if you have not already done so and are comfortable with using a computer.       How to I schedule a follow-up visit?  If you did not schedule a follow-up visit today, please call [phone] to schedule a  follow-up office visit.      Sincerely,    Berna Martin PA-C  Gastroenterology

## 2023-11-14 ENCOUNTER — LAB (OUTPATIENT)
Dept: LAB | Facility: CLINIC | Age: 23
End: 2023-11-14
Payer: COMMERCIAL

## 2023-11-14 DIAGNOSIS — R14.0 BLOATING: ICD-10-CM

## 2023-11-14 DIAGNOSIS — R12 HEARTBURN: ICD-10-CM

## 2023-11-14 DIAGNOSIS — R19.5 CHANGE IN CONSISTENCY OF STOOL: ICD-10-CM

## 2023-11-15 ENCOUNTER — VIRTUAL VISIT (OUTPATIENT)
Dept: FAMILY MEDICINE | Facility: CLINIC | Age: 23
End: 2023-11-15
Payer: COMMERCIAL

## 2023-11-15 DIAGNOSIS — F41.9 ANXIETY AND DEPRESSION: Primary | ICD-10-CM

## 2023-11-15 DIAGNOSIS — F32.A ANXIETY AND DEPRESSION: Primary | ICD-10-CM

## 2023-11-15 PROCEDURE — 99213 OFFICE O/P EST LOW 20 MIN: CPT | Mod: VID | Performed by: NURSE PRACTITIONER

## 2023-11-15 RX ORDER — ESCITALOPRAM OXALATE 10 MG/1
10 TABLET ORAL DAILY
Qty: 60 TABLET | Refills: 1 | Status: SHIPPED | OUTPATIENT
Start: 2023-11-15 | End: 2023-12-06

## 2023-11-15 NOTE — PROGRESS NOTES
Jose Manuel is a 23 year old who is being evaluated via a billable video visit.      How would you like to obtain your AVS? MyChart  If the video visit is dropped, the invitation should be resent by: Text to cell phone: [phone]  Will anyone else be joining your video visit? No          Assessment & Plan     Anxiety and depression  Taper down Paxil- take 1/2 tab for the next 3 days. Start Lexapro.   - escitalopram (LEXAPRO) 10 MG tablet  Dispense: 60 tablet; Refill: 1  May increase dosage: to Lexapro 20mg in 2-4 weeks if tolerating medication well.   RTC if symptoms worsen or fail to improve or if side effects  If tolerating medication well and if feeling improvement in mood and anxiety- then will send more refills and follow up in 3 months.  Continue psychotherapy   Medication side effects, benefits, and information on Black box warning of Lexapro and when to go to ED was provided.                 JAMI Colmenares CNP [facility]   Jose Manuel is a 23 year old, presenting for the following health issues:Anxiety and depression    HPI  Vincenzo was started on Paxil 10mg approx 2 months ago, then increased to Paxil 20mg 1 month ago. He felt improvement on anxiety and mood, however, has been having increased GI side effects - increased bloating, gassy, and worsening diarrhea.  He decreased his dosage back to Paxil 10mg but painful bloating, gas, and worsening diarrhea has persist.  He also has a hx of chronic diarrhea triggered by stress and anxiety - He is is being followed by GI.   Verbally reports that GI doctor suggested switching to Lexapro and to avoid Zoloft. His sister takes Lexapro and does well on this mediation. He is interested in trying Lexapro.   Mood is stable but more irritable this past 2 days. Denies SI or thoughts of hurting others.      No chief complaint on file.        9/19/2023     1:56 PM   Additional Questions   Roomed by Vero PIKE       History of  Present Illness       Mental Health Follow-up:  Patient presents to follow-up on Depression & Anxiety.Patient's depression since last visit has been:  Good  The patient is not having other symptoms associated with depression.  Patient's anxiety since last visit has been:  Good  The patient is not having other symptoms associated with anxiety.  Any significant life events: No  Patient is not feeling anxious or having panic attacks.  Patient has no concerns about alcohol or drug use.    He eats 2-3 servings of fruits and vegetables daily.He consumes 2 sweetened beverage(s) daily.He exercises with enough effort to increase his heart rate 20 to 29 minutes per day.  He exercises with enough effort to increase his heart rate 4 days per week.   He is taking medications regularly.               Review of Systems   See HPI      Objective           Vitals:  No vitals were obtained today due to virtual visit.    Physical Exam   GENERAL: Healthy, alert and no distress  EYES: Eyes grossly normal to inspection.  No discharge or erythema, or obvious scleral/conjunctival abnormalities.  RESP: No audible wheeze, cough, or visible cyanosis.  No visible retractions or increased work of breathing.    SKIN: Visible skin clear. No significant rash, abnormal pigmentation or lesions.  NEURO: Cranial nerves grossly intact.  Mentation and speech appropriate for age.  PSYCH: Mentation appears normal, affect normal/bright, judgement and insight intact, normal speech and appearance well-groomed.                Video-Visit Details    Type of service:  Video Visit     Originating Location (pt. Location): Home    Distant Location (provider location):  On-site  Platform used for Video Visit: HomeSphere

## 2023-11-16 ENCOUNTER — APPOINTMENT (OUTPATIENT)
Dept: LAB | Facility: CLINIC | Age: 23
End: 2023-11-16
Payer: COMMERCIAL

## 2023-11-16 PROCEDURE — 87338 HPYLORI STOOL AG IA: CPT

## 2023-11-16 PROCEDURE — 82653 EL-1 FECAL QUANTITATIVE: CPT

## 2023-11-17 LAB
ELASTASE PANC STL-MCNT: 484 UG/G
H PYLORI AG STL QL IA: NEGATIVE

## 2023-11-24 ENCOUNTER — MYC MEDICAL ADVICE (OUTPATIENT)
Dept: FAMILY MEDICINE | Facility: CLINIC | Age: 23
End: 2023-11-24
Payer: COMMERCIAL

## 2023-12-05 ENCOUNTER — MYC MEDICAL ADVICE (OUTPATIENT)
Dept: FAMILY MEDICINE | Facility: CLINIC | Age: 23
End: 2023-12-05
Payer: COMMERCIAL

## 2023-12-05 ENCOUNTER — TRANSFERRED RECORDS (OUTPATIENT)
Dept: HEALTH INFORMATION MANAGEMENT | Facility: CLINIC | Age: 23
End: 2023-12-05
Payer: COMMERCIAL

## 2023-12-05 DIAGNOSIS — F32.A ANXIETY AND DEPRESSION: ICD-10-CM

## 2023-12-05 DIAGNOSIS — F41.9 ANXIETY AND DEPRESSION: ICD-10-CM

## 2023-12-06 RX ORDER — ESCITALOPRAM OXALATE 10 MG/1
20 TABLET ORAL DAILY
Qty: 60 TABLET | Refills: 1 | Status: SHIPPED | OUTPATIENT
Start: 2023-12-06 | End: 2024-01-02

## 2023-12-12 DIAGNOSIS — R10.30 LOWER ABDOMINAL PAIN, UNSPECIFIED: Primary | ICD-10-CM

## 2023-12-12 RX ORDER — DICYCLOMINE HYDROCHLORIDE 10 MG/1
10 CAPSULE ORAL 2 TIMES DAILY PRN
Qty: 30 CAPSULE | Refills: 5 | Status: SHIPPED | OUTPATIENT
Start: 2023-12-12

## 2023-12-29 DIAGNOSIS — F32.A ANXIETY AND DEPRESSION: ICD-10-CM

## 2023-12-29 DIAGNOSIS — F41.9 ANXIETY AND DEPRESSION: ICD-10-CM

## 2024-01-01 ENCOUNTER — MYC MEDICAL ADVICE (OUTPATIENT)
Dept: FAMILY MEDICINE | Facility: CLINIC | Age: 24
End: 2024-01-01

## 2024-01-01 DIAGNOSIS — F32.A ANXIETY AND DEPRESSION: ICD-10-CM

## 2024-01-01 DIAGNOSIS — F41.9 ANXIETY AND DEPRESSION: ICD-10-CM

## 2024-01-02 RX ORDER — ESCITALOPRAM OXALATE 10 MG/1
20 TABLET ORAL DAILY
Qty: 180 TABLET | Refills: 1 | Status: SHIPPED | OUTPATIENT
Start: 2024-01-02 | End: 2024-05-16 | Stop reason: DRUGHIGH

## 2024-01-03 RX ORDER — ESCITALOPRAM OXALATE 10 MG/1
20 TABLET ORAL DAILY
Qty: 180 TABLET | Refills: 1 | OUTPATIENT
Start: 2024-01-03

## 2024-01-08 RX ORDER — ESCITALOPRAM OXALATE 10 MG/1
20 TABLET ORAL DAILY
Qty: 180 TABLET | Refills: 1 | Status: CANCELLED | OUTPATIENT
Start: 2024-01-08

## 2024-05-15 ASSESSMENT — ANXIETY QUESTIONNAIRES
GAD7 TOTAL SCORE: 3
GAD7 TOTAL SCORE: 3
2. NOT BEING ABLE TO STOP OR CONTROL WORRYING: NOT AT ALL
3. WORRYING TOO MUCH ABOUT DIFFERENT THINGS: SEVERAL DAYS
8. IF YOU CHECKED OFF ANY PROBLEMS, HOW DIFFICULT HAVE THESE MADE IT FOR YOU TO DO YOUR WORK, TAKE CARE OF THINGS AT HOME, OR GET ALONG WITH OTHER PEOPLE?: SOMEWHAT DIFFICULT
4. TROUBLE RELAXING: NOT AT ALL
6. BECOMING EASILY ANNOYED OR IRRITABLE: SEVERAL DAYS
IF YOU CHECKED OFF ANY PROBLEMS ON THIS QUESTIONNAIRE, HOW DIFFICULT HAVE THESE PROBLEMS MADE IT FOR YOU TO DO YOUR WORK, TAKE CARE OF THINGS AT HOME, OR GET ALONG WITH OTHER PEOPLE: SOMEWHAT DIFFICULT
7. FEELING AFRAID AS IF SOMETHING AWFUL MIGHT HAPPEN: NOT AT ALL
7. FEELING AFRAID AS IF SOMETHING AWFUL MIGHT HAPPEN: NOT AT ALL
GAD7 TOTAL SCORE: 3
5. BEING SO RESTLESS THAT IT IS HARD TO SIT STILL: NOT AT ALL
1. FEELING NERVOUS, ANXIOUS, OR ON EDGE: SEVERAL DAYS

## 2024-05-16 ENCOUNTER — VIRTUAL VISIT (OUTPATIENT)
Dept: FAMILY MEDICINE | Facility: CLINIC | Age: 24
End: 2024-05-16
Payer: COMMERCIAL

## 2024-05-16 DIAGNOSIS — F32.A ANXIETY AND DEPRESSION: Primary | ICD-10-CM

## 2024-05-16 DIAGNOSIS — F41.9 ANXIETY AND DEPRESSION: Primary | ICD-10-CM

## 2024-05-16 PROCEDURE — 99214 OFFICE O/P EST MOD 30 MIN: CPT | Mod: 95 | Performed by: NURSE PRACTITIONER

## 2024-05-16 RX ORDER — ESCITALOPRAM OXALATE 20 MG/1
20 TABLET ORAL DAILY
Qty: 90 TABLET | Refills: 1 | Status: SHIPPED | OUTPATIENT
Start: 2024-05-16

## 2024-05-16 NOTE — PROGRESS NOTES
"Jose Manuel is a 24 year old who is being evaluated via a billable video visit.    How would you like to obtain your AVS? MyChart  If the video visit is dropped, the invitation should be resent by: Send to e-mail at: [email or web address]  Will anyone else be joining your video visit? No      Assessment & Plan     Anxiety and depression  Starting to feel better- now having motivation and not struggling to get out of bed.  Phq2 score 2, PRASHANTH score 3  Has been taking lexapro without side effects. Continue same treatment.    - escitalopram (LEXAPRO) 20 MG tablet  Dispense: 90 tablet; Refill: 1  -continue psychotherapy  -He will be moving to [place] in August, I recommended to establish care with provider in [place] for continuity of care.           BMI  Estimated body mass index is 26.88 kg/m  as calculated from the following:    Height as of 11/13/23: 1.753 m (5' 9\").    Weight as of 11/13/23: 82.6 kg (182 lb).   Weight management plan: Discussed healthy diet and exercise guidelines          Subjective   Jose Manuel is a 24 year old, presenting for the following health issues:  Depression (Medication follow up )        5/16/2024     9:42 AM   Additional Questions   Roomed by YOLANDA Castro   Accompanied by alone         5/16/2024     9:42 AM   Patient Reported Additional Medications   Patient reports taking the following new medications none     History of Present Illness       Mental Health Follow-up:  Patient presents to follow-up on Depression & Anxiety.Patient's depression since last visit has been:  Medium  The patient is having other symptoms associated with depression.  Patient's anxiety since last visit has been:  Medium  The patient is having other symptoms associated with anxiety.  Any significant life events: No  Patient is not feeling anxious or having panic attacks.  Patient has no concerns about alcohol or drug use.    He eats 2-3 servings of fruits and vegetables daily.He consumes 0 sweetened beverage(s) daily.He " exercises with enough effort to increase his heart rate 9 or less minutes per day.  He exercises with enough effort to increase his heart rate 3 or less days per week.   He is taking medications regularly.                   Objective           10/11/2023     5:02 PM 11/13/2023     8:30 AM 5/15/2024     8:29 PM   PRASHANTH-7 SCORE   Total Score 2 (minimal anxiety) 1 (minimal anxiety) 3 (minimal anxiety)   Total Score 2 1 3         Vitals:  No vitals were obtained today due to virtual visit.    Physical Exam   GENERAL: alert and no distress  EYES: Eyes grossly normal to inspection.  No discharge or erythema, or obvious scleral/conjunctival abnormalities.  RESP: No audible wheeze, cough, or visible cyanosis.    SKIN: Visible skin clear. No significant rash, abnormal pigmentation or lesions.  NEURO: Cranial nerves grossly intact.  Mentation and speech appropriate for age.  PSYCH: Appropriate affect, tone, and pace of words          Video-Visit Details    Type of service:  Video Visit   Originating Location (pt. Location): Home    Distant Location (provider location):  On-site  Platform used for Video Visit: Rosario  Signed Electronically by: JAMI Colmenares CNP

## 2024-10-17 DIAGNOSIS — F32.A ANXIETY AND DEPRESSION: ICD-10-CM

## 2024-10-17 DIAGNOSIS — F41.9 ANXIETY AND DEPRESSION: ICD-10-CM

## 2024-10-18 NOTE — TELEPHONE ENCOUNTER
1) should not need refill quite yet  2) was to move out of town in August    Please clarify  - is he still in town? If so Saniya has tome to decide whether refill with or without seeing him    escitalopram (LEXAPRO) 20 MG tablet 90 tablet 1 5/16/2024 -- No   Sig - Route: Take 1 tablet (20 mg) by mouth daily - Oral   Sent to pharmacy as: Escitalopram Oxalate 20 MG Oral Tablet (LEXAPRO)   Class: E-Prescribe   Order: [id number]   E-Prescribing Status: Receipt confirmed by pharmacy (5/16/2024 10:22 AM CDT)       Visit 5/16/24 with Saniya Scanlon    Assessment & Plan  Anxiety and depression  Starting to feel better- now having motivation and not struggling to get out of bed.  Phq2 score 2, PRASHANTH score 3  Has been taking lexapro without side effects. Continue same treatment.    - escitalopram (LEXAPRO) 20 MG tablet  Dispense: 90 tablet; Refill: 1  -continue psychotherapy  -He will be moving to [place] in August, I recommended to establish care with provider in [place] for continuity of care.

## 2024-10-19 DIAGNOSIS — R10.30 LOWER ABDOMINAL PAIN, UNSPECIFIED: ICD-10-CM

## 2024-10-23 RX ORDER — ESCITALOPRAM OXALATE 20 MG/1
20 TABLET ORAL DAILY
Qty: 90 TABLET | Refills: 1 | OUTPATIENT
Start: 2024-10-23

## 2024-10-24 RX ORDER — DICYCLOMINE HYDROCHLORIDE 10 MG/1
10 CAPSULE ORAL 2 TIMES DAILY PRN
Qty: 30 CAPSULE | Refills: 0 | Status: SHIPPED | OUTPATIENT
Start: 2024-10-24

## 2024-12-15 ENCOUNTER — HEALTH MAINTENANCE LETTER (OUTPATIENT)
Age: 24
End: 2024-12-15